# Patient Record
Sex: MALE | Race: BLACK OR AFRICAN AMERICAN | Employment: FULL TIME | URBAN - METROPOLITAN AREA
[De-identification: names, ages, dates, MRNs, and addresses within clinical notes are randomized per-mention and may not be internally consistent; named-entity substitution may affect disease eponyms.]

---

## 2017-05-03 ENCOUNTER — HOSPITAL ENCOUNTER (OUTPATIENT)
Dept: LAB | Age: 57
Discharge: HOME | End: 2017-05-03

## 2017-05-03 PROCEDURE — 88305 TISSUE EXAM BY PATHOLOGIST: CPT | Performed by: UROLOGY

## 2017-08-25 PROBLEM — A60.00 HERPES SIMPLEX INFECTION OF GENITOURINARY SYSTEM: Status: ACTIVE | Noted: 2017-08-25

## 2018-02-07 ENCOUNTER — APPOINTMENT (OUTPATIENT)
Dept: GENERAL RADIOLOGY | Age: 58
End: 2018-02-07
Payer: COMMERCIAL

## 2018-02-07 ENCOUNTER — HOSPITAL ENCOUNTER (EMERGENCY)
Age: 58
Discharge: HOME | End: 2018-02-07
Attending: EMERGENCY MEDICINE
Payer: COMMERCIAL

## 2018-02-07 VITALS
DIASTOLIC BLOOD PRESSURE: 89 MMHG | RESPIRATION RATE: 16 BRPM | WEIGHT: 272 LBS | HEIGHT: 71 IN | BODY MASS INDEX: 38.08 KG/M2 | TEMPERATURE: 98.1 F | OXYGEN SATURATION: 98 % | SYSTOLIC BLOOD PRESSURE: 148 MMHG | HEART RATE: 68 BPM

## 2018-02-07 DIAGNOSIS — M54.50 ACUTE MIDLINE LOW BACK PAIN WITHOUT SCIATICA: Primary | ICD-10-CM

## 2018-02-07 PROCEDURE — 81003 URINALYSIS AUTO W/O SCOPE: CPT | Performed by: PHYSICIAN ASSISTANT

## 2018-02-07 PROCEDURE — 96372 THER/PROPH/DIAG INJ SC/IM: CPT | Performed by: PHYSICIAN ASSISTANT

## 2018-02-07 PROCEDURE — 72100 X-RAY EXAM L-S SPINE 2/3 VWS: CPT

## 2018-02-07 PROCEDURE — 74011250637 HC RX REV CODE- 250/637: Performed by: PHYSICIAN ASSISTANT

## 2018-02-07 PROCEDURE — 74011250636 HC RX REV CODE- 250/636: Performed by: PHYSICIAN ASSISTANT

## 2018-02-07 PROCEDURE — 99283 EMERGENCY DEPT VISIT LOW MDM: CPT | Performed by: PHYSICIAN ASSISTANT

## 2018-02-07 RX ORDER — METHOCARBAMOL 750 MG/1
750 TABLET, FILM COATED ORAL 4 TIMES DAILY
Qty: 40 TAB | Refills: 0 | Status: SHIPPED | OUTPATIENT
Start: 2018-02-07 | End: 2018-02-07

## 2018-02-07 RX ORDER — ONDANSETRON 8 MG/1
8 TABLET, ORALLY DISINTEGRATING ORAL
Status: COMPLETED | OUTPATIENT
Start: 2018-02-07 | End: 2018-02-07

## 2018-02-07 RX ORDER — NAPROXEN 500 MG/1
500 TABLET ORAL 2 TIMES DAILY WITH MEALS
Qty: 20 TAB | Refills: 0 | Status: SHIPPED | OUTPATIENT
Start: 2018-02-07 | End: 2018-02-17

## 2018-02-07 RX ORDER — METHOCARBAMOL 750 MG/1
750 TABLET, FILM COATED ORAL 4 TIMES DAILY
Qty: 40 TAB | Refills: 0 | Status: SHIPPED | OUTPATIENT
Start: 2018-02-07 | End: 2018-02-19

## 2018-02-07 RX ORDER — MORPHINE SULFATE 2 MG/ML
4 INJECTION, SOLUTION INTRAMUSCULAR; INTRAVENOUS
Status: COMPLETED | OUTPATIENT
Start: 2018-02-07 | End: 2018-02-07

## 2018-02-07 RX ADMIN — MORPHINE SULFATE 4 MG: 2 INJECTION, SOLUTION INTRAMUSCULAR; INTRAVENOUS at 12:44

## 2018-02-07 RX ADMIN — ONDANSETRON 8 MG: 8 TABLET, ORALLY DISINTEGRATING ORAL at 12:44

## 2018-02-07 NOTE — ED NOTES
I have reviewed discharge instructions with the patient. The patient verbalized understanding. Patient left ED via Discharge Method: ambulatory to Home with self and spouse. Opportunity for questions and clarification provided. Patient given 2 scripts. Work note provided. To continue your aftercare when you leave the hospital, you may receive an automated call from our care team to check in on how you are doing. This is a free service and part of our promise to provide the best care and service to meet your aftercare needs.  If you have questions, or wish to unsubscribe from this service please call [phone]. Thank you for Choosing our [facility] Emergency Department.

## 2018-02-07 NOTE — LETTER
[facility] EMERGENCY DEPT 
[address] [place] [address] [id number] [phone] Work/School Note Date: 2/7/2018 To Whom It May concern: 
 
Augustine Muñoz was seen and treated today in the emergency room by the following provider(s): 
Attending Provider: Gómez Hankins MD 
Physician Assistant: ROJAS Quan. Augustine Muñoz may return to work on 2-8-18. Sincerely, ROJAS Quan

## 2018-02-07 NOTE — DISCHARGE INSTRUCTIONS
Back Pain: Care Instructions  Your Care Instructions    Back pain has many possible causes. It is often related to problems with muscles and ligaments of the back. It may also be related to problems with the nerves, discs, or bones of the back. Moving, lifting, standing, sitting, or sleeping in an awkward way can strain the back. Sometimes you don't notice the injury until later. Arthritis is another common cause of back pain. Although it may hurt a lot, back pain usually improves on its own within several weeks. Most people recover in 12 weeks or less. Using good home treatment and being careful not to stress your back can help you feel better sooner. Follow-up care is a key part of your treatment and safety. Be sure to make and go to all appointments, and call your doctor if you are having problems. It's also a good idea to know your test results and keep a list of the medicines you take. How can you care for yourself at home? · Sit or lie in positions that are most comfortable and reduce your pain. Try one of these positions when you lie down:  ¨ Lie on your back with your knees bent and supported by large pillows. ¨ Lie on the floor with your legs on the seat of a sofa or chair. [place] on your side with your knees and hips bent and a pillow between your legs. ¨ Lie on your stomach if it does not make pain worse. · Do not sit up in bed, and avoid soft couches and twisted positions. Bed rest can help relieve pain at first, but it delays healing. Avoid bed rest after the first day of back pain. · Change positions every 30 minutes. If you must sit for long periods of time, take breaks from sitting. Get up and walk around, or lie in a comfortable position. · Try using a heating pad on a low or medium setting for 15 to 20 minutes every 2 or 3 hours. Try a warm shower in place of one session with the heating pad. · You can also try an ice pack for 10 to 15 minutes every 2 to 3 hours.  Put a thin cloth between the ice pack and your skin. · Take pain medicines exactly as directed. ¨ If the doctor gave you a prescription medicine for pain, take it as prescribed. ¨ If you are not taking a prescription pain medicine, ask your doctor if you can take an over-the-counter medicine. · Take short walks several times a day. You can start with 5 to 10 minutes, 3 or 4 times a day, and work up to longer walks. Walk on level surfaces and avoid hills and stairs until your back is better. · Return to work and other activities as soon as you can. Continued rest without activity is usually not good for your back. · To prevent future back pain, do exercises to stretch and strengthen your back and stomach. Learn how to use good posture, safe lifting techniques, and proper body mechanics. When should you call for help? Call your doctor now or seek immediate medical care if:  ? · You have new or worsening numbness in your legs. ? · You have new or worsening weakness in your legs. (This could make it hard to stand up.)   ? · You lose control of your bladder or bowels. ? Watch closely for changes in your health, and be sure to contact your doctor if:  ? · Your pain gets worse. ? · You are not getting better after 2 weeks. Where can you learn more? Go to http://christine-eddie.info/. Enter K017 in the search box to learn more about \"Back Pain: Care Instructions. \"  Current as of: March 21, 2017  Content Version: 11.4  © 8954-8038 [employer] [facility] EHR. Care instructions adapted under license by [employer] (which disclaims liability or warranty for this information). If you have questions about a medical condition or this instruction, always ask your healthcare professional. [address] any warranty or liability for your use of this information. Back Stretches: Exercises  Your Care Instructions  Here are some examples of exercises for stretching your back.  Start each exercise slowly. Ease off the exercise if you start to have pain. Your doctor or physical therapist will tell you when you can start these exercises and which ones will work best for you. How to do the exercises  Overhead stretch    1. Stand comfortably with your feet shoulder-width apart. 2. Looking straight ahead, raise both arms over your head and reach toward the ceiling. Do not allow your head to tilt back. 3. Hold for 15 to 30 seconds, then lower your arms to your sides. 4. Repeat 2 to 4 times. Side stretch    1. Stand comfortably with your feet shoulder-width apart. 2. Raise one arm over your head, and then lean to the other side. 3. Slide your hand down your leg as you let the weight of your arm gently stretch your side muscles. Hold for 15 to 30 seconds. 4. Repeat 2 to 4 times on each side. Press-up    1. Lie on your stomach, supporting your body with your forearms. 2. Press your elbows down into the floor to raise your upper back. As you do this, relax your stomach muscles and allow your back to arch without using your back muscles. As your press up, do not let your hips or pelvis come off the floor. 3. Hold for 15 to 30 seconds, then relax. 4. Repeat 2 to 4 times. Relax and rest    1. Lie on your back with a rolled towel under your neck and a pillow under your knees. Extend your arms comfortably to your sides. 2. Relax and breathe normally. 3. Remain in this position for about 10 minutes. 4. If you can, do this 2 or 3 times each day. Follow-up care is a key part of your treatment and safety. Be sure to make and go to all appointments, and call your doctor if you are having problems. It's also a good idea to know your test results and keep a list of the medicines you take. Where can you learn more? Go to http://christine-eddie.info/. Enter O791 in the search box to learn more about \"Back Stretches: Exercises. \"  Current as of: March 21, 2017  Content Version: 11.4  © 9876-3726 [employer]. Care instructions adapted under license by [employer] (which disclaims liability or warranty for this information). If you have questions about a medical condition or this instruction, always ask your healthcare professional. [address] any warranty or liability for your use of this information.

## 2018-02-07 NOTE — ED PROVIDER NOTES
Patient is a 56 y.o. male presenting with back pain. The history is provided by the patient. Back Pain    This is a new problem. The current episode started more than 2 days ago. The problem has been gradually worsening. The problem occurs rarely. Patient reports not work related injury. The pain is associated with no known injury. The pain is present in the lumbar spine. The quality of the pain is described as aching. The pain does not radiate. The pain is at a severity of 8/10. The pain is moderate. The symptoms are aggravated by certain positions. The pain is the same all the time. Pertinent negatives include no bladder incontinence, no dysuria, no leg pain, no paresthesias, no paresis, no tingling and no weakness. He has tried NSAIDs for the symptoms. The treatment provided no relief. Risk factors include a sedentary lifestyle. 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    Hypercholesteremia     Hyperlipidemia 4/27/2015    Hypertension     Lateral epicondylitis     Mild concentric left ventricular hypertrophy (LVH)     Obesity     Other ill-defined conditions(799.89)     seasonal allergies    Vitamin D deficiency 4/27/2015       Past Surgical History:   Procedure Laterality Date    HX COLONOSCOPY  2013    no polyps    HX HERNIA REPAIR  05/2012    Bilateral inguinal hernia         Family History:   Problem Relation Age of Onset    Hypertension Mother     Lung Disease Father     Hypertension Sister     Hypertension Sister        Social History     Social History    Marital status: SINGLE     Spouse name: N/A    Number of children: N/A    Years of education: N/A     Occupational History    Not on file.      Social History Main Topics    Smoking status: Never Smoker    Smokeless tobacco: Never Used    Alcohol use No    Drug use: No    Sexual activity: Not on file     Other Topics Concern    Not on file     Social History Narrative         ALLERGIES: Review of patient's allergies indicates no known allergies. Review of Systems   Genitourinary: Negative for bladder incontinence and dysuria. Musculoskeletal: Positive for back pain. Neurological: Negative for tingling, weakness and paresthesias. All other systems reviewed and are negative. Vitals:    02/07/18 1121   BP: (!) 153/92   Pulse: 69   Resp: 17   Temp: 98.1 °F (36.7 °C)   SpO2: 98%   Weight: 123.4 kg (272 lb)   Height: 5' 11\" (1.803 m)            Physical Exam   Constitutional: He is oriented to person, place, and time. He appears well-developed and well-nourished. No distress. HENT:   Head: Normocephalic and atraumatic. Eyes: Conjunctivae and EOM are normal. Pupils are equal, round, and reactive to light. Neck: Normal range of motion. Neck supple. Cardiovascular: Normal rate and regular rhythm. Pulmonary/Chest: Effort normal and breath sounds normal. No respiratory distress. He has no wheezes. Abdominal: Soft. Bowel sounds are normal.   Musculoskeletal: He exhibits tenderness. He exhibits no edema. Back:    Pain to palpation mid lower back area, no flank pain, no pain into buttocks or legs,no skin changes    Neurological: He is alert and oriented to person, place, and time. Skin: Skin is warm. Nursing note and vitals reviewed.        MDM  Number of Diagnoses or Management Options  Diagnosis management comments: Urine dip -  l spine x rays -  Feel pain is muscular given morphine 4 mg im in er  rx for robaxin and naprosyn, work note given        Amount and/or Complexity of Data Reviewed  Clinical lab tests: ordered and reviewed  Tests in the radiology section of CPT®: ordered and reviewed  Review and summarize past medical records: yes    Risk of Complications, Morbidity, and/or Mortality  Presenting problems: moderate  Diagnostic procedures: moderate  Management options: low    Patient Progress  Patient progress: improved        ED Course       Procedures

## 2018-02-19 PROBLEM — N50.819 TESTALGIA: Status: ACTIVE | Noted: 2018-02-19

## 2018-08-14 PROBLEM — E66.01 SEVERE OBESITY (BMI 35.0-39.9): Status: ACTIVE | Noted: 2018-08-14

## 2018-10-30 ENCOUNTER — HOSPITAL ENCOUNTER (OUTPATIENT)
Dept: LAB | Age: 58
Discharge: HOME | End: 2018-10-30

## 2018-10-30 PROBLEM — Z98.890 HISTORY OF COLONOSCOPY: Status: ACTIVE | Noted: 2018-10-30

## 2018-10-30 PROCEDURE — 88305 TISSUE EXAM BY PATHOLOGIST: CPT

## 2018-11-14 PROBLEM — K63.5 HYPERPLASTIC POLYP OF ASCENDING COLON: Status: ACTIVE | Noted: 2018-11-14

## 2019-05-25 ENCOUNTER — HOSPITAL ENCOUNTER (OUTPATIENT)
Dept: CT IMAGING | Age: 59
Discharge: HOME | End: 2019-05-25
Attending: FAMILY MEDICINE
Payer: COMMERCIAL

## 2019-05-25 DIAGNOSIS — G89.29 CHRONIC RLQ PAIN: ICD-10-CM

## 2019-05-25 DIAGNOSIS — G89.29 CHRONIC RIGHT-SIDED LOW BACK PAIN WITH RIGHT-SIDED SCIATICA: ICD-10-CM

## 2019-05-25 DIAGNOSIS — R10.31 CHRONIC RLQ PAIN: ICD-10-CM

## 2019-05-25 DIAGNOSIS — R11.0 NAUSEA: ICD-10-CM

## 2019-05-25 DIAGNOSIS — M54.41 CHRONIC RIGHT-SIDED LOW BACK PAIN WITH RIGHT-SIDED SCIATICA: ICD-10-CM

## 2019-05-25 LAB — CREAT BLD-MCNC: 1.3 MG/DL (ref 0.8–1.5)

## 2019-05-25 PROCEDURE — 74011000258 HC RX REV CODE- 258: Performed by: FAMILY MEDICINE

## 2019-05-25 PROCEDURE — 74011636320 HC RX REV CODE- 636/320: Performed by: FAMILY MEDICINE

## 2019-05-25 PROCEDURE — 74177 CT ABD & PELVIS W/CONTRAST: CPT

## 2019-05-25 PROCEDURE — 82565 ASSAY OF CREATININE: CPT

## 2019-05-25 RX ORDER — SODIUM CHLORIDE 0.9 % (FLUSH) 0.9 %
10 SYRINGE (ML) INJECTION
Status: COMPLETED | OUTPATIENT
Start: 2019-05-25 | End: 2019-05-25

## 2019-05-25 RX ADMIN — SODIUM CHLORIDE 100 ML: 900 INJECTION, SOLUTION INTRAVENOUS at 10:58

## 2019-05-25 RX ADMIN — Medication 10 ML: at 10:58

## 2019-05-25 RX ADMIN — DIATRIZOATE MEGLUMINE AND DIATRIZOATE SODIUM 15 ML: 660; 100 LIQUID ORAL; RECTAL at 10:58

## 2019-05-25 RX ADMIN — IOPAMIDOL 100 ML: 755 INJECTION, SOLUTION INTRAVENOUS at 10:58

## 2019-06-21 ENCOUNTER — HOSPITAL ENCOUNTER (OUTPATIENT)
Dept: LAB | Age: 59
Discharge: HOME | End: 2019-06-21
Payer: COMMERCIAL

## 2019-06-21 DIAGNOSIS — D72.819 LEUKOPENIA, UNSPECIFIED TYPE: ICD-10-CM

## 2019-06-21 PROBLEM — E66.01 SEVERE OBESITY (HCC): Status: ACTIVE | Noted: 2019-06-21

## 2019-06-21 LAB
ALBUMIN SERPL-MCNC: 4.2 G/DL (ref 3.5–5)
ALBUMIN/GLOB SERPL: 1 {RATIO} (ref 1.2–3.5)
ALP SERPL-CCNC: 69 U/L (ref 50–136)
ALT SERPL-CCNC: 40 U/L (ref 12–65)
ANION GAP SERPL CALC-SCNC: 6 MMOL/L (ref 7–16)
AST SERPL-CCNC: 29 U/L (ref 15–37)
BASOPHILS # BLD: 0 K/UL (ref 0–0.2)
BASOPHILS NFR BLD: 0 % (ref 0–2)
BILIRUB SERPL-MCNC: 0.5 MG/DL (ref 0.2–1.1)
BUN SERPL-MCNC: 13 MG/DL (ref 6–23)
CALCIUM SERPL-MCNC: 10 MG/DL (ref 8.3–10.4)
CHLORIDE SERPL-SCNC: 104 MMOL/L (ref 98–107)
CO2 SERPL-SCNC: 29 MMOL/L (ref 21–32)
CREAT SERPL-MCNC: 1.27 MG/DL (ref 0.8–1.5)
DIFFERENTIAL METHOD BLD: ABNORMAL
EOSINOPHIL # BLD: 0.1 K/UL (ref 0–0.8)
EOSINOPHIL NFR BLD: 2 % (ref 0.5–7.8)
ERYTHROCYTE [DISTWIDTH] IN BLOOD BY AUTOMATED COUNT: 14.8 % (ref 11.9–14.6)
GLOBULIN SER CALC-MCNC: 4.3 G/DL (ref 2.3–3.5)
GLUCOSE SERPL-MCNC: 97 MG/DL (ref 65–100)
HBV SURFACE AB SERPL IA-ACNC: 13.94 MIU/ML
HCT VFR BLD AUTO: 39 % (ref 41.1–50.3)
HGB BLD-MCNC: 12.6 G/DL (ref 13.6–17.2)
IMM GRANULOCYTES # BLD AUTO: 0 K/UL (ref 0–0.5)
IMM GRANULOCYTES NFR BLD AUTO: 0 % (ref 0–5)
KAPPA LC FREE SER-MCNC: 21.51 MG/L (ref 3.3–19.4)
KAPPA LC FREE/LAMBDA FREE SER: 1.05 {RATIO} (ref 0.26–1.65)
LAMBDA LC FREE SERPL-MCNC: 20.5 MG/L (ref 5.71–26.3)
LYMPHOCYTES # BLD: 0.8 K/UL (ref 0.5–4.6)
LYMPHOCYTES NFR BLD: 26 % (ref 13–44)
MCH RBC QN AUTO: 26.5 PG (ref 26.1–32.9)
MCHC RBC AUTO-ENTMCNC: 32.3 G/DL (ref 31.4–35)
MCV RBC AUTO: 81.9 FL (ref 79.6–97.8)
MONOCYTES # BLD: 0.4 K/UL (ref 0.1–1.3)
MONOCYTES NFR BLD: 12 % (ref 4–12)
NEUTS SEG # BLD: 1.8 K/UL (ref 1.7–8.2)
NEUTS SEG NFR BLD: 60 % (ref 43–78)
NRBC # BLD: 0 K/UL (ref 0–0.2)
PLATELET # BLD AUTO: 256 K/UL (ref 150–450)
PMV BLD AUTO: 10.8 FL (ref 9.4–12.3)
POTASSIUM SERPL-SCNC: 4.2 MMOL/L (ref 3.5–5.1)
PROT SERPL-MCNC: 8.5 G/DL (ref 6.3–8.2)
RBC # BLD AUTO: 4.76 M/UL (ref 4.23–5.67)
SODIUM SERPL-SCNC: 139 MMOL/L (ref 136–145)
WBC # BLD AUTO: 3 K/UL (ref 4.3–11.1)

## 2019-06-21 PROCEDURE — 84165 PROTEIN E-PHORESIS SERUM: CPT

## 2019-06-21 PROCEDURE — 86706 HEP B SURFACE ANTIBODY: CPT

## 2019-06-21 PROCEDURE — 36415 COLL VENOUS BLD VENIPUNCTURE: CPT

## 2019-06-21 PROCEDURE — 86705 HEP B CORE ANTIBODY IGM: CPT

## 2019-06-21 PROCEDURE — 85025 COMPLETE CBC W/AUTO DIFF WBC: CPT

## 2019-06-21 PROCEDURE — 80053 COMPREHEN METABOLIC PANEL: CPT

## 2019-06-21 PROCEDURE — 86803 HEPATITIS C AB TEST: CPT

## 2019-06-21 PROCEDURE — 83883 ASSAY NEPHELOMETRY NOT SPEC: CPT

## 2019-06-22 LAB
HBV CORE IGM SERPL QL IA: NEGATIVE
HCV AB S/CO SERPL IA: <0.1 S/CO RATIO (ref 0–0.9)

## 2019-06-24 LAB
ALBUMIN SERPL ELPH-MCNC: 4.29 G/DL (ref 3.2–5.6)
ALBUMIN/GLOB SERPL: 1.1 {RATIO}
ALPHA1 GLOB SERPL ELPH-MCNC: 0.17 G/DL (ref 0.1–0.4)
ALPHA2 GLOB SERPL ELPH-MCNC: 0.75 G/DL (ref 0.4–1.2)
B-GLOBULIN SERPL QL ELPH: 1.08 G/DL (ref 0.6–1.3)
GAMMA GLOB MFR SERPL ELPH: 2.01 G/DL (ref 0.5–1.6)
M PROTEIN SERPL ELPH-MCNC: ABNORMAL G/DL
PROT PATTERN SERPL ELPH-IMP: ABNORMAL
PROT SERPL-MCNC: 8.3 G/DL (ref 6.3–8.2)

## 2019-08-06 PROBLEM — N52.9 VASCULOGENIC ERECTILE DYSFUNCTION: Status: ACTIVE | Noted: 2019-08-06

## 2020-02-06 PROBLEM — D72.819 LEUKOPENIA: Status: ACTIVE | Noted: 2020-02-06

## 2020-09-03 PROBLEM — E66.01 SEVERE OBESITY (HCC): Status: ACTIVE | Noted: 2020-09-03

## 2020-09-30 ENCOUNTER — HOSPITAL ENCOUNTER (OUTPATIENT)
Dept: MRI IMAGING | Age: 60
Discharge: HOME | End: 2020-09-30
Attending: UROLOGY
Payer: COMMERCIAL

## 2020-09-30 DIAGNOSIS — R97.20 ELEVATED PSA: ICD-10-CM

## 2020-09-30 PROCEDURE — 74011000258 HC RX REV CODE- 258: Performed by: UROLOGY

## 2020-09-30 PROCEDURE — 72197 MRI PELVIS W/O & W/DYE: CPT

## 2020-09-30 PROCEDURE — A9575 INJ GADOTERATE MEGLUMI 0.1ML: HCPCS | Performed by: UROLOGY

## 2020-09-30 PROCEDURE — 74011250636 HC RX REV CODE- 250/636: Performed by: UROLOGY

## 2020-09-30 RX ORDER — SODIUM CHLORIDE 0.9 % (FLUSH) 0.9 %
10 SYRINGE (ML) INJECTION
Status: COMPLETED | OUTPATIENT
Start: 2020-09-30 | End: 2020-09-30

## 2020-09-30 RX ORDER — GADOTERATE MEGLUMINE 376.9 MG/ML
24 INJECTION INTRAVENOUS
Status: COMPLETED | OUTPATIENT
Start: 2020-09-30 | End: 2020-09-30

## 2020-09-30 RX ADMIN — Medication 10 ML: at 07:47

## 2020-09-30 RX ADMIN — SODIUM CHLORIDE 100 ML: 900 INJECTION, SOLUTION INTRAVENOUS at 07:47

## 2020-09-30 RX ADMIN — GADOTERATE MEGLUMINE 24 ML: 376.9 INJECTION INTRAVENOUS at 07:47

## 2022-03-18 PROBLEM — N50.819 TESTALGIA: Status: ACTIVE | Noted: 2018-02-19

## 2022-03-18 PROBLEM — D72.819 LEUKOPENIA: Status: ACTIVE | Noted: 2020-02-06

## 2022-03-19 PROBLEM — A60.00 HERPES SIMPLEX INFECTION OF GENITOURINARY SYSTEM: Status: ACTIVE | Noted: 2017-08-25

## 2022-03-19 PROBLEM — N52.9 VASCULOGENIC ERECTILE DYSFUNCTION: Status: ACTIVE | Noted: 2019-08-06

## 2022-03-20 PROBLEM — K63.5 HYPERPLASTIC POLYP OF ASCENDING COLON: Status: ACTIVE | Noted: 2018-11-14

## 2022-03-29 ENCOUNTER — ANESTHESIA EVENT (OUTPATIENT)
Dept: SURGERY | Age: 62
End: 2022-03-29
Payer: COMMERCIAL

## 2022-03-30 ENCOUNTER — HOSPITAL ENCOUNTER (OUTPATIENT)
Age: 62
Setting detail: OUTPATIENT SURGERY
Discharge: HOME | End: 2022-03-30
Attending: UROLOGY | Admitting: UROLOGY
Payer: COMMERCIAL

## 2022-03-30 ENCOUNTER — ANESTHESIA (OUTPATIENT)
Dept: SURGERY | Age: 62
End: 2022-03-30
Payer: COMMERCIAL

## 2022-03-30 VITALS
HEIGHT: 71 IN | WEIGHT: 270 LBS | BODY MASS INDEX: 37.8 KG/M2 | OXYGEN SATURATION: 99 % | RESPIRATION RATE: 18 BRPM | TEMPERATURE: 98 F | HEART RATE: 68 BPM | DIASTOLIC BLOOD PRESSURE: 92 MMHG | SYSTOLIC BLOOD PRESSURE: 130 MMHG

## 2022-03-30 DIAGNOSIS — R97.20 PSA ELEVATION: ICD-10-CM

## 2022-03-30 PROCEDURE — 74011000250 HC RX REV CODE- 250: Performed by: NURSE ANESTHETIST, CERTIFIED REGISTERED

## 2022-03-30 PROCEDURE — 64590 INS/RPL PRPH SAC/GSTR NPG/R: CPT | Performed by: UROLOGY

## 2022-03-30 PROCEDURE — 88305 TISSUE EXAM BY PATHOLOGIST: CPT

## 2022-03-30 PROCEDURE — 76010000138 HC OR TIME 0.5 TO 1 HR: Performed by: UROLOGY

## 2022-03-30 PROCEDURE — 2709999900 HC NON-CHARGEABLE SUPPLY: Performed by: UROLOGY

## 2022-03-30 PROCEDURE — 74011250636 HC RX REV CODE- 250/636: Performed by: NURSE ANESTHETIST, CERTIFIED REGISTERED

## 2022-03-30 PROCEDURE — 76210000006 HC OR PH I REC 0.5 TO 1 HR: Performed by: UROLOGY

## 2022-03-30 PROCEDURE — 77030003481 HC NDL BIOP GUN BARD -B: Performed by: UROLOGY

## 2022-03-30 PROCEDURE — 74011000250 HC RX REV CODE- 250: Performed by: UROLOGY

## 2022-03-30 PROCEDURE — 76060000032 HC ANESTHESIA 0.5 TO 1 HR: Performed by: UROLOGY

## 2022-03-30 PROCEDURE — 74011250636 HC RX REV CODE- 250/636: Performed by: UROLOGY

## 2022-03-30 PROCEDURE — 76942 ECHO GUIDE FOR BIOPSY: CPT | Performed by: UROLOGY

## 2022-03-30 PROCEDURE — 74011250636 HC RX REV CODE- 250/636: Performed by: ANESTHESIOLOGY

## 2022-03-30 PROCEDURE — 76210000020 HC REC RM PH II FIRST 0.5 HR: Performed by: UROLOGY

## 2022-03-30 RX ORDER — ALBUTEROL SULFATE 0.83 MG/ML
2.5 SOLUTION RESPIRATORY (INHALATION) AS NEEDED
Status: DISCONTINUED | OUTPATIENT
Start: 2022-03-30 | End: 2022-03-30 | Stop reason: HOSPADM

## 2022-03-30 RX ORDER — PROCHLORPERAZINE EDISYLATE 5 MG/ML
5 INJECTION INTRAMUSCULAR; INTRAVENOUS ONCE
Status: DISCONTINUED | OUTPATIENT
Start: 2022-03-30 | End: 2022-03-30 | Stop reason: HOSPADM

## 2022-03-30 RX ORDER — PROPOFOL 10 MG/ML
INJECTION, EMULSION INTRAVENOUS
Status: DISCONTINUED | OUTPATIENT
Start: 2022-03-30 | End: 2022-03-30 | Stop reason: HOSPADM

## 2022-03-30 RX ORDER — FENTANYL CITRATE 50 UG/ML
100 INJECTION, SOLUTION INTRAMUSCULAR; INTRAVENOUS ONCE
Status: DISCONTINUED | OUTPATIENT
Start: 2022-03-30 | End: 2022-03-30 | Stop reason: HOSPADM

## 2022-03-30 RX ORDER — OXYCODONE HYDROCHLORIDE 5 MG/1
5 TABLET ORAL
Status: DISCONTINUED | OUTPATIENT
Start: 2022-03-30 | End: 2022-03-30 | Stop reason: HOSPADM

## 2022-03-30 RX ORDER — MIDAZOLAM HYDROCHLORIDE 1 MG/ML
2 INJECTION, SOLUTION INTRAMUSCULAR; INTRAVENOUS
Status: DISCONTINUED | OUTPATIENT
Start: 2022-03-30 | End: 2022-03-30 | Stop reason: HOSPADM

## 2022-03-30 RX ORDER — CEFAZOLIN SODIUM/WATER 2 G/20 ML
SYRINGE (ML) INTRAVENOUS AS NEEDED
Status: DISCONTINUED | OUTPATIENT
Start: 2022-03-30 | End: 2022-03-30 | Stop reason: HOSPADM

## 2022-03-30 RX ORDER — HYDROMORPHONE HYDROCHLORIDE 2 MG/ML
0.5 INJECTION, SOLUTION INTRAMUSCULAR; INTRAVENOUS; SUBCUTANEOUS
Status: DISCONTINUED | OUTPATIENT
Start: 2022-03-30 | End: 2022-03-30 | Stop reason: HOSPADM

## 2022-03-30 RX ORDER — ACETAMINOPHEN 500 MG
1000 TABLET ORAL ONCE
Status: DISCONTINUED | OUTPATIENT
Start: 2022-03-30 | End: 2022-03-30 | Stop reason: HOSPADM

## 2022-03-30 RX ORDER — MIDAZOLAM HYDROCHLORIDE 1 MG/ML
2 INJECTION, SOLUTION INTRAMUSCULAR; INTRAVENOUS ONCE
Status: DISCONTINUED | OUTPATIENT
Start: 2022-03-30 | End: 2022-03-30 | Stop reason: HOSPADM

## 2022-03-30 RX ORDER — LIDOCAINE HYDROCHLORIDE 20 MG/ML
INJECTION, SOLUTION EPIDURAL; INFILTRATION; INTRACAUDAL; PERINEURAL AS NEEDED
Status: DISCONTINUED | OUTPATIENT
Start: 2022-03-30 | End: 2022-03-30 | Stop reason: HOSPADM

## 2022-03-30 RX ORDER — SODIUM CHLORIDE, SODIUM LACTATE, POTASSIUM CHLORIDE, CALCIUM CHLORIDE 600; 310; 30; 20 MG/100ML; MG/100ML; MG/100ML; MG/100ML
50 INJECTION, SOLUTION INTRAVENOUS CONTINUOUS
Status: DISCONTINUED | OUTPATIENT
Start: 2022-03-30 | End: 2022-03-30 | Stop reason: HOSPADM

## 2022-03-30 RX ORDER — SODIUM CHLORIDE, SODIUM LACTATE, POTASSIUM CHLORIDE, CALCIUM CHLORIDE 600; 310; 30; 20 MG/100ML; MG/100ML; MG/100ML; MG/100ML
75 INJECTION, SOLUTION INTRAVENOUS CONTINUOUS
Status: DISCONTINUED | OUTPATIENT
Start: 2022-03-30 | End: 2022-03-30 | Stop reason: HOSPADM

## 2022-03-30 RX ORDER — LIDOCAINE HYDROCHLORIDE 10 MG/ML
0.1 INJECTION INFILTRATION; PERINEURAL AS NEEDED
Status: DISCONTINUED | OUTPATIENT
Start: 2022-03-30 | End: 2022-03-30 | Stop reason: HOSPADM

## 2022-03-30 RX ORDER — FAMOTIDINE 10 MG/ML
INJECTION INTRAVENOUS
Status: DISCONTINUED
Start: 2022-03-30 | End: 2022-03-30 | Stop reason: WASHOUT

## 2022-03-30 RX ORDER — PROPOFOL 10 MG/ML
INJECTION, EMULSION INTRAVENOUS AS NEEDED
Status: DISCONTINUED | OUTPATIENT
Start: 2022-03-30 | End: 2022-03-30 | Stop reason: HOSPADM

## 2022-03-30 RX ADMIN — PHENYLEPHRINE HYDROCHLORIDE 100 MCG: 10 INJECTION INTRAVENOUS at 17:13

## 2022-03-30 RX ADMIN — PHENYLEPHRINE HYDROCHLORIDE 100 MCG: 10 INJECTION INTRAVENOUS at 17:09

## 2022-03-30 RX ADMIN — PROPOFOL 50 MG: 10 INJECTION, EMULSION INTRAVENOUS at 16:52

## 2022-03-30 RX ADMIN — PROPOFOL 200 MCG/KG/MIN: 10 INJECTION, EMULSION INTRAVENOUS at 16:52

## 2022-03-30 RX ADMIN — SODIUM CHLORIDE, SODIUM LACTATE, POTASSIUM CHLORIDE, AND CALCIUM CHLORIDE 75 ML/HR: 600; 310; 30; 20 INJECTION, SOLUTION INTRAVENOUS at 13:54

## 2022-03-30 RX ADMIN — LIDOCAINE HYDROCHLORIDE 60 MG: 20 INJECTION, SOLUTION EPIDURAL; INFILTRATION; INTRACAUDAL; PERINEURAL at 16:52

## 2022-03-30 RX ADMIN — PHENYLEPHRINE HYDROCHLORIDE 100 MCG: 10 INJECTION INTRAVENOUS at 17:16

## 2022-03-30 RX ADMIN — CEFAZOLIN SODIUM 3 G: 100 INJECTION, POWDER, LYOPHILIZED, FOR SOLUTION INTRAVENOUS at 14:01

## 2022-03-30 RX ADMIN — Medication 3 G: at 17:00

## 2022-03-30 NOTE — ANESTHESIA PREPROCEDURE EVALUATION
Relevant Problems   CARDIOVASCULAR   (+) HTN with concentric LVH       Anesthetic History   No history of anesthetic complications            Review of Systems / Medical History  Patient summary reviewed, nursing notes reviewed and pertinent labs reviewed    Pulmonary  Within defined limits                 Neuro/Psych   Within defined limits           Cardiovascular    Hypertension: well controlled          Hyperlipidemia    Exercise tolerance: >4 METS     GI/Hepatic/Renal  Within defined limits              Endo/Other        Obesity     Other Findings   Comments: BPH         Physical Exam    Airway  Mallampati: II      Mouth opening: Normal     Cardiovascular  Regular rate and rhythm,  S1 and S2 normal,  no murmur, click, rub, or gallop             Dental    Dentition: Upper partial plate     Pulmonary  Breath sounds clear to auscultation               Abdominal         Other Findings            Anesthetic Plan    ASA: 2  Anesthesia type: total IV anesthesia          Induction: Intravenous  Anesthetic plan and risks discussed with: Patient and Spouse

## 2022-03-30 NOTE — OP NOTES
[facility]  OPERATIVE REPORT    Name:  Gretel Stallings  MR#:  [id number]  :  1960  ACCOUNT #:  [de-identified]  DATE OF SERVICE:  2022    PREOPERATIVE DIAGNOSIS:  Elevated prostate-specific antigen. POSTOPERATIVE DIAGNOSIS:  Elevated prostate-specific antigen. PROCEDURE PERFORMED:  Saturation biopsy of the prostate. SURGEON:  Salina Gross MD    ASSISTANT:  None    ANESTHESIA:  Sedation. COMPLICATIONS:  None. SPECIMENS REMOVED:  Multiple prostate biopsies as described in the text. ESTIMATED BLOOD LOSS:  Minimal.    PROCEDURE:  The patient was taken to the OR and after sedation was achieved, he was placed in the left lateral decubitus position. Rectal exam was performed. This revealed a large gland without any asymmetry, nodule, or induration. Transrectal ultrasound probe was inserted and the prostate was imaged in real time. The prostate is enlarged. There was some corpora amylacea in the transitional zone, but otherwise, appeared unremarkable as did the seminal vesicles. I then proceeded to take the biopsies. Five biopsies were taken from either apex, either mid gland and either base for a total of 30 biopsies. The patient tolerated the procedure well. There were no complications.       Irena Cuevas MD      [id number]  D:  2022 18:14  T:  2022 18:35  JOB #:  [id number]

## 2022-03-30 NOTE — PERIOP NOTES
Discharge instructions given to patient's wife, Sanju Adams, at bedside. Verbalized understanding. No questions at this time.

## 2022-03-30 NOTE — ANESTHESIA POSTPROCEDURE EVALUATION
Procedure(s):  SATURATION BIOPSY OF PROSTATE.    total IV anesthesia    Anesthesia Post Evaluation      Multimodal analgesia: multimodal analgesia used between 6 hours prior to anesthesia start to PACU discharge  Patient location during evaluation: PACU  Patient participation: complete - patient participated  Level of consciousness: awake  Pain management: adequate  Airway patency: patent  Anesthetic complications: no  Cardiovascular status: acceptable  Respiratory status: acceptable, spontaneous ventilation and nonlabored ventilation  Hydration status: acceptable  Post anesthesia nausea and vomiting:  none      INITIAL Post-op Vital signs:   Vitals Value Taken Time   /95 03/30/22 1757   Temp 36.4 °C (97.6 °F) 03/30/22 1735   Pulse 70 03/30/22 1759   Resp 14 03/30/22 1735   SpO2 100 % 03/30/22 1759   Vitals shown include unvalidated device data.

## 2022-03-30 NOTE — BRIEF OP NOTE
Brief Postoperative Note    Patient: Carey Bai  YOB: 1960  MRN: [id number]    Date of Procedure: 3/30/2022     Pre-Op Diagnosis: Elevated PSA [R97.20]  Benign prostatic hyperplasia, unspecified whether lower urinary tract symptoms present [N40.0]    Post-Op Diagnosis:Same    Procedure(s):  SATURATION BIOPSY OF PROSTATE    Surgeon(s):  Miguel Saab MD    Surgical Assistant: None    Anesthesia: MAC     Estimated Blood Loss (mL): Minimal    Complications: None    Specimens:   ID Type Source Tests Collected by Time Destination   1 : RIGHT APEX Preservative Prostate  Miguel Saab MD 3/30/2022 1718 Pathology   2 : RIGHT MID Preservative Prostate  Miguel Saab MD 3/30/2022 1718 Pathology   3 : RIGHT BASE Preservative Prostate  Miguel Saab MD 3/30/2022 1718 Pathology   4 : LEFT APEX Preservative Prostate  Miguel Saab MD 3/30/2022 1718 Pathology   5 : LEFT MID Preservative Prostate  Miguel Saab MD 3/30/2022 1718 Pathology   6 : LEFT BASE Preservative Prostate  Miguel Saab MD 3/30/2022 1718 Pathology        Drains:None      Electronically Signed by Daisha Mendiola MD on 3/30/2022 at 5:57 PM

## 2022-04-20 PROBLEM — F11.99 OPIOID USE, UNSPECIFIED WITH UNSPECIFIED OPIOID-INDUCED DISORDER (HCC): Status: ACTIVE | Noted: 2022-04-20

## 2022-07-11 ENCOUNTER — OFFICE VISIT (OUTPATIENT)
Dept: FAMILY MEDICINE CLINIC | Facility: CLINIC | Age: 62
End: 2022-07-11
Payer: COMMERCIAL

## 2022-07-11 VITALS
DIASTOLIC BLOOD PRESSURE: 74 MMHG | OXYGEN SATURATION: 97 % | HEART RATE: 79 BPM | WEIGHT: 270 LBS | HEIGHT: 71 IN | SYSTOLIC BLOOD PRESSURE: 122 MMHG | BODY MASS INDEX: 37.8 KG/M2

## 2022-07-11 DIAGNOSIS — D70.9 NEUTROPENIA, UNSPECIFIED TYPE (HCC): ICD-10-CM

## 2022-07-11 DIAGNOSIS — I10 PRIMARY HYPERTENSION: Primary | ICD-10-CM

## 2022-07-11 DIAGNOSIS — R77.1 ELEVATED SERUM GLOBULIN LEVEL: ICD-10-CM

## 2022-07-11 DIAGNOSIS — E78.00 PURE HYPERCHOLESTEROLEMIA: ICD-10-CM

## 2022-07-11 DIAGNOSIS — N40.0 BENIGN PROSTATIC HYPERPLASIA WITHOUT LOWER URINARY TRACT SYMPTOMS: ICD-10-CM

## 2022-07-11 PROBLEM — F11.99 OPIOID USE, UNSPECIFIED WITH UNSPECIFIED OPIOID-INDUCED DISORDER (HCC): Status: RESOLVED | Noted: 2022-04-20 | Resolved: 2022-07-11

## 2022-07-11 PROBLEM — D72.819 LEUKOPENIA: Status: ACTIVE | Noted: 2020-02-06

## 2022-07-11 PROCEDURE — 99214 OFFICE O/P EST MOD 30 MIN: CPT | Performed by: FAMILY MEDICINE

## 2022-07-11 RX ORDER — CYCLOBENZAPRINE HCL 10 MG
10 TABLET ORAL 2 TIMES DAILY PRN
Qty: 90 TABLET | Refills: 1 | Status: SHIPPED | OUTPATIENT
Start: 2022-07-11

## 2022-07-11 RX ORDER — SILDENAFIL 100 MG/1
100 TABLET, FILM COATED ORAL PRN
Qty: 30 TABLET | Refills: 1 | Status: SHIPPED | OUTPATIENT
Start: 2022-07-11 | End: 2022-07-11 | Stop reason: SDUPTHER

## 2022-07-11 RX ORDER — AMLODIPINE BESYLATE AND BENAZEPRIL HYDROCHLORIDE 5; 20 MG/1; MG/1
1 CAPSULE ORAL DAILY
Qty: 90 CAPSULE | Refills: 1 | Status: SHIPPED | OUTPATIENT
Start: 2022-07-11

## 2022-07-11 RX ORDER — SILDENAFIL 100 MG/1
100 TABLET, FILM COATED ORAL PRN
Qty: 30 TABLET | Refills: 1 | Status: SHIPPED | OUTPATIENT
Start: 2022-07-11 | End: 2022-07-19

## 2022-07-11 RX ORDER — VALACYCLOVIR HYDROCHLORIDE 1 G/1
1000 TABLET, FILM COATED ORAL 2 TIMES DAILY
Qty: 180 TABLET | Refills: 1 | Status: SHIPPED | OUTPATIENT
Start: 2022-07-11

## 2022-07-11 RX ORDER — ATORVASTATIN CALCIUM 20 MG/1
20 TABLET, FILM COATED ORAL DAILY
Qty: 90 TABLET | Refills: 1 | Status: SHIPPED | OUTPATIENT
Start: 2022-07-11

## 2022-07-11 RX ORDER — TAMSULOSIN HYDROCHLORIDE 0.4 MG/1
0.4 CAPSULE ORAL DAILY
Qty: 90 CAPSULE | Refills: 1 | Status: SHIPPED | OUTPATIENT
Start: 2022-07-11

## 2022-07-11 ASSESSMENT — PATIENT HEALTH QUESTIONNAIRE - PHQ9
SUM OF ALL RESPONSES TO PHQ QUESTIONS 1-9: 0
SUM OF ALL RESPONSES TO PHQ QUESTIONS 1-9: 0
2. FEELING DOWN, DEPRESSED OR HOPELESS: 0
SUM OF ALL RESPONSES TO PHQ9 QUESTIONS 1 & 2: 0
1. LITTLE INTEREST OR PLEASURE IN DOING THINGS: 0
SUM OF ALL RESPONSES TO PHQ QUESTIONS 1-9: 0
SUM OF ALL RESPONSES TO PHQ QUESTIONS 1-9: 0

## 2022-07-11 NOTE — PROGRESS NOTES
Date/Time     01/20/2022 08:30 AM    K 4.3 01/20/2022 08:30 AM     01/20/2022 08:30 AM    CO2 24 01/20/2022 08:30 AM    BUN 10 01/20/2022 08:30 AM    CREATININE 1.23 01/20/2022 08:30 AM    GLUCOSE 91 01/20/2022 08:30 AM    CALCIUM 9.6 01/20/2022 08:30 AM          Objective:  Blood pressure 122/74, pulse 79, height 5' 11\" (1.803 m), weight 270 lb (122.5 kg), SpO2 97 %. Body mass index is 37.66 kg/m². BP Readings from Last 3 Encounters:   07/11/22 122/74   03/02/22 (!) 141/91   01/20/22 130/80     General- Pleasant and no distress  Psych- alert and oriented to person, place and time  Mood and affect are appropriate to situation  Musculoskeletal - Gait and station examination reveals mid-position with no abnormalities. Neurological- grossly intact. rrr s mrg.   bcta  extremeties are without edema, and dp, and pt pulses are intact  No carotid bruits   Fundoscopic exam is: benign without retinopathology     Assessment:  1. Primary hypertension    2. Neutropenia, unspecified type ([address]    3. Benign prostatic hyperplasia without lower urinary tract symptoms    4. Elevated serum globulin level    5. Pure hypercholesterolemia        Plan:   Prescription drug management occurs today as follows:  Because current regimen for blood pressure is now working and tolerated, will continue medications as listed    Manisha Aldridge has been given the following recommendations today due to his elevated BP reading: lab tests ordered. Personal instruction is given. For your information, consider the following:  Namita Earl is an excellent site that will give you a list of approved blood pressure devices  [employer] is an excellent site that will teach you how to take accurate bp measurements at home. Significant weight loss can result in a drop of 5-10mm blood pressure. A DASH diet (see bookstore or go to www.[employer] and print DASH diet) will lower 8-14mm blood pressure.   Salt restriction will lower your bp 2-8mm. Lowering alcohol consumption to moderate use will lower your pressure 2-4mm. Continue efforts to maintain an exercise regimen. Normal blood pressure target is now 120/80. Stage 1 or \"pre-hypertension\" or \"high normal hypertension\" is 130-139/80-89. We sometimes begin treatment with medications. Stage 2 is >140/90 which is when we always begin treatment with medications. For high risk patients such as those with heart disease, a history of stents, angioplasty, heart attacks, strokes, diabetes and chronic kidney disease,your blood pressure goal is 130/80. For lower risk patients without the high risk categories, your goal for blood pressure is 139/89. Unless you are older than 72years old we may try for a systolic bp of 578 or we will accept higher pressures if the lower pressures are not tolerated. 1. Primary hypertension  -     amLODIPine-benazepril (LOTREL) 5-20 MG per capsule; Take 1 capsule by mouth daily, Disp-90 capsule, R-1Normal  2. Neutropenia, unspecified type ([address]  3. Benign prostatic hyperplasia without lower urinary tract symptoms  4. Elevated serum globulin level  5. Pure hypercholesterolemia  -     atorvastatin (LIPITOR) 20 MG tablet; Take 1 tablet by mouth daily, Disp-90 tablet, R-1Normal      Followup:  Return for 6 mo for htn, lipids, labs.

## 2022-07-19 ENCOUNTER — TELEPHONE (OUTPATIENT)
Dept: FAMILY MEDICINE CLINIC | Facility: CLINIC | Age: 62
End: 2022-07-19

## 2022-07-19 RX ORDER — TADALAFIL 20 MG/1
20 TABLET ORAL DAILY PRN
Qty: 30 TABLET | Refills: 1 | Status: SHIPPED | OUTPATIENT
Start: 2022-07-19

## 2022-07-19 NOTE — TELEPHONE ENCOUNTER
Received notification from [facility] that insurance will not cover Sildenafil. Requesting different medication. Please advise.

## 2023-01-05 ENCOUNTER — OFFICE VISIT (OUTPATIENT)
Dept: FAMILY MEDICINE CLINIC | Facility: CLINIC | Age: 63
End: 2023-01-05
Payer: COMMERCIAL

## 2023-01-05 VITALS
HEART RATE: 81 BPM | WEIGHT: 280.2 LBS | DIASTOLIC BLOOD PRESSURE: 84 MMHG | OXYGEN SATURATION: 97 % | BODY MASS INDEX: 39.08 KG/M2 | SYSTOLIC BLOOD PRESSURE: 130 MMHG

## 2023-01-05 DIAGNOSIS — E78.00 PURE HYPERCHOLESTEROLEMIA: ICD-10-CM

## 2023-01-05 DIAGNOSIS — N40.0 BENIGN PROSTATIC HYPERPLASIA WITHOUT LOWER URINARY TRACT SYMPTOMS: ICD-10-CM

## 2023-01-05 DIAGNOSIS — I10 PRIMARY HYPERTENSION: Primary | ICD-10-CM

## 2023-01-05 DIAGNOSIS — D70.9 NEUTROPENIA, UNSPECIFIED TYPE (HCC): ICD-10-CM

## 2023-01-05 DIAGNOSIS — R25.2 MUSCLE CRAMP, NOCTURNAL: ICD-10-CM

## 2023-01-05 DIAGNOSIS — E66.01 MORBID OBESITY (HCC): ICD-10-CM

## 2023-01-05 PROBLEM — D72.819 LEUKOPENIA: Status: RESOLVED | Noted: 2020-02-06 | Resolved: 2023-01-05

## 2023-01-05 PROCEDURE — 3079F DIAST BP 80-89 MM HG: CPT | Performed by: FAMILY MEDICINE

## 2023-01-05 PROCEDURE — 3075F SYST BP GE 130 - 139MM HG: CPT | Performed by: FAMILY MEDICINE

## 2023-01-05 PROCEDURE — 99214 OFFICE O/P EST MOD 30 MIN: CPT | Performed by: FAMILY MEDICINE

## 2023-01-05 RX ORDER — ATORVASTATIN CALCIUM 20 MG/1
20 TABLET, FILM COATED ORAL DAILY
Qty: 90 TABLET | Refills: 1 | Status: SHIPPED | OUTPATIENT
Start: 2023-01-05

## 2023-01-05 RX ORDER — AMLODIPINE BESYLATE AND BENAZEPRIL HYDROCHLORIDE 5; 20 MG/1; MG/1
1 CAPSULE ORAL DAILY
Qty: 90 CAPSULE | Refills: 1 | Status: SHIPPED | OUTPATIENT
Start: 2023-01-05

## 2023-01-05 RX ORDER — TADALAFIL 20 MG/1
20 TABLET ORAL DAILY PRN
Qty: 30 TABLET | Refills: 1 | Status: SHIPPED | OUTPATIENT
Start: 2023-01-05

## 2023-01-05 RX ORDER — TAMSULOSIN HYDROCHLORIDE 0.4 MG/1
0.4 CAPSULE ORAL DAILY
Qty: 90 CAPSULE | Refills: 1 | Status: SHIPPED | OUTPATIENT
Start: 2023-01-05

## 2023-01-05 RX ORDER — CYCLOBENZAPRINE HCL 10 MG
10 TABLET ORAL NIGHTLY PRN
Qty: 10 TABLET | Refills: 0 | Status: SHIPPED | OUTPATIENT
Start: 2023-01-05 | End: 2023-01-15

## 2023-01-05 ASSESSMENT — PATIENT HEALTH QUESTIONNAIRE - PHQ9
SUM OF ALL RESPONSES TO PHQ QUESTIONS 1-9: 0
SUM OF ALL RESPONSES TO PHQ QUESTIONS 1-9: 0
1. LITTLE INTEREST OR PLEASURE IN DOING THINGS: 0
SUM OF ALL RESPONSES TO PHQ9 QUESTIONS 1 & 2: 0
SUM OF ALL RESPONSES TO PHQ QUESTIONS 1-9: 0
SUM OF ALL RESPONSES TO PHQ QUESTIONS 1-9: 0
2. FEELING DOWN, DEPRESSED OR HOPELESS: 0

## 2023-01-05 NOTE — PROGRESS NOTES
Subjective:  Adriana Hillman is a 63 y.o. male presents today for their semi-annual htn and lipids, and ED refills and visit. They are having no side effects and are doing well. Sees PGU for bph and psa following  Also about a week ago thought he heard something at night set up quickly and strained his neck on the right side. Still little sore. Systems review of cardiovascular and pulmonary systems reveal no complaints or pertinent positives. Patient denies any pounding heart beats or rapid heart beat intervals, flushing, panic like attacks, headaches, dizzyness or flushing. They have drug reistant hypertension, on 3 or more different medicaitons including one diuretic- No    How much are you exercising? Every other day   Do you smoke? No  Are you taking your medicines as directed most every day? Yes  Do you follow a low sodium DASH diet or similar high blood pressure diet? Yes  Do you check your bp at home with an automated blood pressure device? Yes  If you don't have a home bp machine, you should purchase one at home and begin to check your pressure at home on a regular basis. Your blood pressure goal is listed under the \"plan section\" below  PHQ-9 Total Score: 0 (1/5/2023  7:49 AM)    No Known Allergies   reports that he has never smoked.  He has never used smokeless tobacco.  Current Outpatient Medications   Medication Sig Dispense Refill    tadalafil (CIALIS) 20 MG tablet Take 1 tablet by mouth daily as needed for Erectile Dysfunction 30 tablet 1    amLODIPine-benazepril (LOTREL) 5-20 MG per capsule Take 1 capsule by mouth daily 90 capsule 1    atorvastatin (LIPITOR) 20 MG tablet Take 1 tablet by mouth daily 90 tablet 1    tamsulosin (FLOMAX) 0.4 MG capsule Take 1 capsule by mouth daily 90 capsule 1    cyclobenzaprine (FLEXERIL) 10 MG tablet Take 1 tablet by mouth nightly as needed for Muscle spasms 10 tablet 0    cyclobenzaprine (FLEXERIL) 10 MG tablet Take 1 tablet by mouth 2 times daily as needed for Muscle spasms 90 tablet 1    valACYclovir (VALTREX) 1 g tablet Take 1 tablet by mouth 2 times daily 180 tablet 1     No current facility-administered medications for this visit. Lab Results   Component Value Date/Time     01/20/2022 08:30 AM    K 4.3 01/20/2022 08:30 AM     01/20/2022 08:30 AM    CO2 24 01/20/2022 08:30 AM    BUN 10 01/20/2022 08:30 AM    CREATININE 1.23 01/20/2022 08:30 AM    GLUCOSE 91 01/20/2022 08:30 AM    CALCIUM 9.6 01/20/2022 08:30 AM          Objective:  Blood pressure 130/84, pulse 81, weight 280 lb 3.2 oz (127.1 kg), SpO2 97 %. Body mass index is 39.08 kg/m². BP Readings from Last 3 Encounters:   01/05/23 130/84   07/11/22 122/74   03/02/22 (!) 141/91     General- Pleasant and no distress  Psych- alert and oriented to person, place and time  Mood and affect are appropriate to situation  Musculoskeletal - Gait and station examination reveals mid-position with no abnormalities. Neurological- grossly intact. rrr s mrg.   bcta  extremeties are without edema, and dp, and pt pulses are intact  No carotid bruits   Fundoscopic exam is: benign without retinopathology     Assessment:  1. Primary hypertension    2. Neutropenia, unspecified type ([facility] [address]    3. Pure hypercholesterolemia    4. Morbid obesity ([facility] [address]    5. Benign prostatic hyperplasia without lower urinary tract symptoms    6. Muscle cramp, nocturnal        Plan:   Prescription drug management occurs today as follows:  Because current regimen for blood pressure is now working and tolerated, will continue medications as listed  Flexeril at bedtime for a week or so should help his neck soreness  Ana Earl has been given the following recommendations today due to his elevated BP reading: lab tests ordered. Personal instruction is given.   For your information, consider the following:  [facility] is an excellent site that will give you a list of approved blood pressure devices  Targetbp.org is an excellent site that will teach you how to take accurate bp measurements at home. Significant weight loss can result in a drop of 5-10mm blood pressure. A DASH diet (see bookstore or go to www.[employer] and print DASH diet) will lower 8-14mm blood pressure. Salt restriction will lower your bp 2-8mm. Lowering alcohol consumption to moderate use will lower your pressure 2-4mm. Continue efforts to maintain an exercise regimen. Normal blood pressure target is now 120/80. Stage 1 or \"pre-hypertension\" or \"high normal hypertension\" is 130-139/80-89. We sometimes begin treatment with medications. Stage 2 is >140/90 which is when we always begin treatment with medications. For high risk patients such as those with heart disease, a history of stents, angioplasty, heart attacks, strokes, diabetes and chronic kidney disease,your blood pressure goal is 130/80. For lower risk patients without the high risk categories, your goal for blood pressure is 139/89. Unless you are older than 72years old we may try for a systolic bp of 069 or we will accept higher pressures if the lower pressures are not tolerated. 1. Primary hypertension  -     amLODIPine-benazepril (LOTREL) 5-20 MG per capsule; Take 1 capsule by mouth daily, Disp-90 capsule, R-1Normal  -     Basic Metabolic Panel; Future  2. Neutropenia, unspecified type [facility])  Assessment & Plan:   Asymptomatic, continue current medications and continue current treatment plan    3. Pure hypercholesterolemia  -     atorvastatin (LIPITOR) 20 MG tablet; Take 1 tablet by mouth daily, Disp-90 tablet, R-1Normal  -     Lipid Panel; Future  4. Morbid obesity ([facility] [address]  5. Benign prostatic hyperplasia without lower urinary tract symptoms  -     tadalafil (CIALIS) 20 MG tablet; Take 1 tablet by mouth daily as needed for Erectile Dysfunction, Disp-30 tablet, R-1Print  -     tamsulosin (FLOMAX) 0.4 MG capsule; Take 1 capsule by mouth daily, Disp-90 capsule, R-1Normal  6.  Muscle cramp, nocturnal  -     cyclobenzaprine (FLEXERIL) 10 MG tablet; Take 1 tablet by mouth nightly as needed for Muscle spasms, Disp-10 tablet, R-0Normal      Followup:  Return for 6 mo for bp recheck.

## 2023-01-06 LAB
ANION GAP SERPL CALC-SCNC: 4 MMOL/L (ref 2–11)
BUN SERPL-MCNC: 14 MG/DL (ref 8–23)
CALCIUM SERPL-MCNC: 9.2 MG/DL (ref 8.3–10.4)
CHLORIDE SERPL-SCNC: 109 MMOL/L (ref 101–110)
CHOLEST SERPL-MCNC: 89 MG/DL
CO2 SERPL-SCNC: 28 MMOL/L (ref 21–32)
CREAT SERPL-MCNC: 1.2 MG/DL (ref 0.8–1.5)
GLUCOSE SERPL-MCNC: 96 MG/DL (ref 65–100)
HDLC SERPL-MCNC: 42 MG/DL (ref 40–60)
HDLC SERPL: 2.1 {RATIO}
LDLC SERPL CALC-MCNC: 31.6 MG/DL
POTASSIUM SERPL-SCNC: 4.1 MMOL/L (ref 3.5–5.1)
SODIUM SERPL-SCNC: 141 MMOL/L (ref 133–143)
TRIGL SERPL-MCNC: 77 MG/DL (ref 35–150)
VLDLC SERPL CALC-MCNC: 15.4 MG/DL (ref 6–23)

## 2023-04-04 ENCOUNTER — OFFICE VISIT (OUTPATIENT)
Dept: UROLOGY | Age: 63
End: 2023-04-04
Payer: COMMERCIAL

## 2023-04-04 DIAGNOSIS — N52.9 ERECTILE DYSFUNCTION, UNSPECIFIED ERECTILE DYSFUNCTION TYPE: ICD-10-CM

## 2023-04-04 DIAGNOSIS — R97.20 ELEVATED PROSTATE SPECIFIC ANTIGEN (PSA): Primary | ICD-10-CM

## 2023-04-04 DIAGNOSIS — N40.1 BENIGN PROSTATIC HYPERPLASIA WITH LOWER URINARY TRACT SYMPTOMS, SYMPTOM DETAILS UNSPECIFIED: ICD-10-CM

## 2023-04-04 LAB
BILIRUBIN, URINE, POC: NEGATIVE
BLOOD URINE, POC: NEGATIVE
GLUCOSE URINE, POC: NEGATIVE
KETONES, URINE, POC: NORMAL
LEUKOCYTE ESTERASE, URINE, POC: NEGATIVE
NITRITE, URINE, POC: NEGATIVE
PH, URINE, POC: 5.5 (ref 4.6–8)
PROTEIN,URINE, POC: NEGATIVE
SPECIFIC GRAVITY, URINE, POC: 1.03 (ref 1–1.03)
URINALYSIS CLARITY, POC: NORMAL
URINALYSIS COLOR, POC: NORMAL
UROBILINOGEN, POC: NORMAL

## 2023-04-04 PROCEDURE — 99213 OFFICE O/P EST LOW 20 MIN: CPT | Performed by: UROLOGY

## 2023-04-04 PROCEDURE — 81003 URINALYSIS AUTO W/O SCOPE: CPT | Performed by: UROLOGY

## 2023-04-04 RX ORDER — TADALAFIL 5 MG/1
5 TABLET ORAL DAILY
Qty: 90 TABLET | Refills: 1 | Status: SHIPPED | OUTPATIENT
Start: 2023-04-04

## 2023-04-04 ASSESSMENT — ENCOUNTER SYMPTOMS
EYES NEGATIVE: 1
RESPIRATORY NEGATIVE: 1

## 2023-04-04 NOTE — PROGRESS NOTES
PSA 10.1 January 2022 (free PSA 16.7%)   PSA 7.3 August 2020  PSA 7.9 February 2020  PSA 4.9 February 2019  PSA 5.9 February 2018  PSA 5.5 January 2017  PSA 5.4 January 2016  PSA 4.7 January 2016      Patient is doing well with Flomax with respect to lower urinary tract symptoms. He does have Cialis 20 mg at home but has not used it in quite some time. He is status post 2 negative biopsies as well as a negative MRI. Prostate volume was estimated at 90 cm³ which would give us a PSA density of 0.11. Rectal exam reveals a large prostate without asymmetry, nodule, or induration. We are going to continue Flomax. He would like to try daily Cialis and I will send this to his pharmacy. PSA is drawn today. If this is reassuring he will follow-up with me in 1 year.
education level: Not on file   Occupational History    Not on file   Tobacco Use    Smoking status: Never    Smokeless tobacco: Never   Substance and Sexual Activity    Alcohol use: No     Alcohol/week: 0.0 standard drinks    Drug use: No    Sexual activity: Not on file   Other Topics Concern    Not on file   Social History Narrative    Not on file     Social Determinants of Health     Financial Resource Strain: Not on file   Food Insecurity: Not on file   Transportation Needs: Not on file   Physical Activity: Not on file   Stress: Not on file   Social Connections: Not on file   Intimate Partner Violence: Not on file   Housing Stability: Not on file     Family History   Problem Relation Age of Onset    Hypertension Sister     Hypertension Sister     Hypertension Mother     Lung Disease Father        Review of Systems  Constitutional: Negative  Skin: Negative skin ROS  Eyes: Eyes negative  ENT: HENT negative  Respiratory: Respiratory negative  Cardiovascular: Positive for hypertension.   GI: Neg GI ROS  Genitourinary: Genitourinary negative  Musculoskeletal: Musculoskeletal negative  Neurological: Neg neuro ROS  Psychological: Neg psych ROS  Endocrine: Endocrine negative  Hem/Lymphatic: Hematologic/lymphatic negative    Urinalysis  UA - Dipstick  Results for orders placed or performed in visit on 04/04/23   AMB POC URINALYSIS DIP STICK AUTO W/O MICRO   Result Value Ref Range    Color (UA POC)      Clarity (UA POC)      Glucose, Urine, POC Negative Negative    Bilirubin, Urine, POC Negative Negative    KETONES, Urine, POC Trace Negative    Specific Gravity, Urine, POC 1.030 1.001 - 1.035    Blood (UA POC) Negative Negative    pH, Urine, POC 5.5 4.6 - 8.0    Protein, Urine, POC Negative Negative    Urobilinogen, POC Normal     Nitrite, Urine, POC Negative Negative    Leukocyte Esterase, Urine, POC Negative Negative   Results for orders placed or performed in visit on 05/22/19   AMB POC URINALYSIS DIP STICK

## 2023-07-07 ENCOUNTER — OFFICE VISIT (OUTPATIENT)
Dept: FAMILY MEDICINE CLINIC | Facility: CLINIC | Age: 63
End: 2023-07-07
Payer: COMMERCIAL

## 2023-07-07 VITALS
HEIGHT: 71 IN | OXYGEN SATURATION: 96 % | BODY MASS INDEX: 38.16 KG/M2 | HEART RATE: 77 BPM | SYSTOLIC BLOOD PRESSURE: 138 MMHG | DIASTOLIC BLOOD PRESSURE: 86 MMHG | WEIGHT: 272.6 LBS

## 2023-07-07 DIAGNOSIS — I10 PRIMARY HYPERTENSION: Primary | ICD-10-CM

## 2023-07-07 DIAGNOSIS — E66.01 MORBID OBESITY (HCC): ICD-10-CM

## 2023-07-07 DIAGNOSIS — E78.00 PURE HYPERCHOLESTEROLEMIA: ICD-10-CM

## 2023-07-07 DIAGNOSIS — N40.0 BENIGN PROSTATIC HYPERPLASIA WITHOUT LOWER URINARY TRACT SYMPTOMS: ICD-10-CM

## 2023-07-07 PROCEDURE — 99214 OFFICE O/P EST MOD 30 MIN: CPT | Performed by: FAMILY MEDICINE

## 2023-07-07 PROCEDURE — 3075F SYST BP GE 130 - 139MM HG: CPT | Performed by: FAMILY MEDICINE

## 2023-07-07 PROCEDURE — 3079F DIAST BP 80-89 MM HG: CPT | Performed by: FAMILY MEDICINE

## 2023-07-07 RX ORDER — AMLODIPINE BESYLATE AND BENAZEPRIL HYDROCHLORIDE 5; 20 MG/1; MG/1
1 CAPSULE ORAL DAILY
Qty: 90 CAPSULE | Refills: 1 | Status: SHIPPED | OUTPATIENT
Start: 2023-07-07

## 2023-07-07 RX ORDER — TAMSULOSIN HYDROCHLORIDE 0.4 MG/1
0.4 CAPSULE ORAL DAILY
Qty: 90 CAPSULE | Refills: 1 | Status: SHIPPED | OUTPATIENT
Start: 2023-07-07

## 2023-07-07 RX ORDER — ATORVASTATIN CALCIUM 20 MG/1
20 TABLET, FILM COATED ORAL DAILY
Qty: 90 TABLET | Refills: 1 | Status: SHIPPED | OUTPATIENT
Start: 2023-07-07

## 2023-07-07 SDOH — ECONOMIC STABILITY: FOOD INSECURITY: WITHIN THE PAST 12 MONTHS, THE FOOD YOU BOUGHT JUST DIDN'T LAST AND YOU DIDN'T HAVE MONEY TO GET MORE.: NEVER TRUE

## 2023-07-07 SDOH — ECONOMIC STABILITY: FOOD INSECURITY: WITHIN THE PAST 12 MONTHS, YOU WORRIED THAT YOUR FOOD WOULD RUN OUT BEFORE YOU GOT MONEY TO BUY MORE.: NEVER TRUE

## 2023-07-07 SDOH — ECONOMIC STABILITY: INCOME INSECURITY: HOW HARD IS IT FOR YOU TO PAY FOR THE VERY BASICS LIKE FOOD, HOUSING, MEDICAL CARE, AND HEATING?: NOT HARD AT ALL

## 2023-07-07 SDOH — ECONOMIC STABILITY: HOUSING INSECURITY
IN THE LAST 12 MONTHS, WAS THERE A TIME WHEN YOU DID NOT HAVE A STEADY PLACE TO SLEEP OR SLEPT IN A SHELTER (INCLUDING NOW)?: NO

## 2023-07-07 ASSESSMENT — PATIENT HEALTH QUESTIONNAIRE - PHQ9
2. FEELING DOWN, DEPRESSED OR HOPELESS: 0
SUM OF ALL RESPONSES TO PHQ QUESTIONS 1-9: 0
SUM OF ALL RESPONSES TO PHQ9 QUESTIONS 1 & 2: 0
SUM OF ALL RESPONSES TO PHQ QUESTIONS 1-9: 0
SUM OF ALL RESPONSES TO PHQ QUESTIONS 1-9: 0
1. LITTLE INTEREST OR PLEASURE IN DOING THINGS: 0
SUM OF ALL RESPONSES TO PHQ QUESTIONS 1-9: 0

## 2023-07-07 NOTE — PROGRESS NOTES
Subjective:  Brian Dotson is a 63 y.o. male presents today for their semi-annual htn and dyslipidemia and BPH refills visit. They are having no side effects and are doing well. Systems review of cardiovascular and pulmonary systems reveal no complaints or pertinent positives. Patient denies any pounding heart beats or rapid heart beat intervals, flushing, panic like attacks, headaches, dizzyness or flushing. They have drug reistant hypertension, on 3 or more different medicaitons including one diuretic- No  PHQ-9 Total Score: 0 (7/7/2023  8:00 AM)    He is also frustrated with inability to lose weight despite eliminating  How much are you exercising? Every other day. Do you smoke? No  Are you taking your medicines as directed most every day? Yes  Do you follow a low sodium DASH diet or similar high blood pressure diet? No  Do you check your bp at home with an automated blood pressure device? Yes  If you don't have a home bp machine, you should purchase one at home and begin to check your pressure at home on a regular basis. Your blood pressure goal is listed under the \"plan section\" below    No Known Allergies   reports that he has never smoked.  He has never used smokeless tobacco.  Current Outpatient Medications   Medication Sig Dispense Refill    amLODIPine-benazepril (LOTREL) 5-20 MG per capsule Take 1 capsule by mouth daily 90 capsule 1    atorvastatin (LIPITOR) 20 MG tablet Take 1 tablet by mouth daily 90 tablet 1    tamsulosin (FLOMAX) 0.4 MG capsule Take 1 capsule by mouth daily 90 capsule 1    tadalafil (CIALIS) 5 MG tablet Take 1 tablet by mouth daily 90 tablet 1    tadalafil (CIALIS) 20 MG tablet Take 1 tablet by mouth daily as needed for Erectile Dysfunction 30 tablet 1    cyclobenzaprine (FLEXERIL) 10 MG tablet Take 1 tablet by mouth 2 times daily as needed for Muscle spasms 90 tablet 1    valACYclovir (VALTREX) 1 g tablet Take 1 tablet by mouth 2 times daily 180 tablet 1     No current

## 2023-07-08 LAB
ANION GAP SERPL CALC-SCNC: 4 MMOL/L (ref 2–11)
BUN SERPL-MCNC: 15 MG/DL (ref 8–23)
CALCIUM SERPL-MCNC: 9.9 MG/DL (ref 8.3–10.4)
CHLORIDE SERPL-SCNC: 110 MMOL/L (ref 101–110)
CO2 SERPL-SCNC: 27 MMOL/L (ref 21–32)
CREAT SERPL-MCNC: 1.2 MG/DL (ref 0.8–1.5)
GLUCOSE SERPL-MCNC: 98 MG/DL (ref 65–100)
POTASSIUM SERPL-SCNC: 4.1 MMOL/L (ref 3.5–5.1)
SODIUM SERPL-SCNC: 141 MMOL/L (ref 133–143)

## 2023-07-16 ENCOUNTER — HOSPITAL ENCOUNTER (EMERGENCY)
Age: 63
Discharge: HOME | End: 2023-07-16
Attending: EMERGENCY MEDICINE
Payer: COMMERCIAL

## 2023-07-16 ENCOUNTER — APPOINTMENT (OUTPATIENT)
Dept: GENERAL RADIOLOGY | Age: 63
End: 2023-07-16
Payer: COMMERCIAL

## 2023-07-16 ENCOUNTER — APPOINTMENT (OUTPATIENT)
Dept: CT IMAGING | Age: 63
End: 2023-07-16
Payer: COMMERCIAL

## 2023-07-16 VITALS
WEIGHT: 270 LBS | HEIGHT: 71 IN | SYSTOLIC BLOOD PRESSURE: 148 MMHG | TEMPERATURE: 98 F | RESPIRATION RATE: 20 BRPM | OXYGEN SATURATION: 99 % | HEART RATE: 86 BPM | DIASTOLIC BLOOD PRESSURE: 77 MMHG | BODY MASS INDEX: 37.8 KG/M2

## 2023-07-16 DIAGNOSIS — R55 SYNCOPE AND COLLAPSE: Primary | ICD-10-CM

## 2023-07-16 DIAGNOSIS — D64.9 ANEMIA, UNSPECIFIED TYPE: ICD-10-CM

## 2023-07-16 DIAGNOSIS — E86.0 DEHYDRATION: ICD-10-CM

## 2023-07-16 LAB
ALBUMIN SERPL-MCNC: 4.2 G/DL (ref 3.2–4.6)
ALBUMIN/GLOB SERPL: 0.9 (ref 0.4–1.6)
ALP SERPL-CCNC: 71 U/L (ref 50–136)
ALT SERPL-CCNC: 32 U/L (ref 12–65)
ANION GAP SERPL CALC-SCNC: 6 MMOL/L (ref 2–11)
AST SERPL-CCNC: 29 U/L (ref 15–37)
BASOPHILS # BLD: 0 K/UL (ref 0–0.2)
BASOPHILS NFR BLD: 0 % (ref 0–2)
BILIRUB SERPL-MCNC: 0.3 MG/DL (ref 0.2–1.1)
BUN SERPL-MCNC: 19 MG/DL (ref 8–23)
CALCIUM SERPL-MCNC: 9.6 MG/DL (ref 8.3–10.4)
CHLORIDE SERPL-SCNC: 102 MMOL/L (ref 101–110)
CO2 SERPL-SCNC: 28 MMOL/L (ref 21–32)
CREAT SERPL-MCNC: 1.68 MG/DL (ref 0.8–1.5)
DIFFERENTIAL METHOD BLD: ABNORMAL
EOSINOPHIL # BLD: 0 K/UL (ref 0–0.8)
EOSINOPHIL NFR BLD: 0 % (ref 0.5–7.8)
ERYTHROCYTE [DISTWIDTH] IN BLOOD BY AUTOMATED COUNT: 18.4 % (ref 11.9–14.6)
GLOBULIN SER CALC-MCNC: 4.8 G/DL (ref 2.8–4.5)
GLUCOSE SERPL-MCNC: 103 MG/DL (ref 65–100)
HCT VFR BLD AUTO: 31.3 % (ref 41.1–50.3)
HGB BLD-MCNC: 8.9 G/DL (ref 13.6–17.2)
IMM GRANULOCYTES # BLD AUTO: 0 K/UL (ref 0–0.5)
IMM GRANULOCYTES NFR BLD AUTO: 0 % (ref 0–5)
LYMPHOCYTES # BLD: 0.5 K/UL (ref 0.5–4.6)
LYMPHOCYTES NFR BLD: 6 % (ref 13–44)
MCH RBC QN AUTO: 19.6 PG (ref 26.1–32.9)
MCHC RBC AUTO-ENTMCNC: 28.4 G/DL (ref 31.4–35)
MCV RBC AUTO: 69.1 FL (ref 82–102)
MONOCYTES # BLD: 0.4 K/UL (ref 0.1–1.3)
MONOCYTES NFR BLD: 5 % (ref 4–12)
NEUTS SEG # BLD: 7.9 K/UL (ref 1.7–8.2)
NEUTS SEG NFR BLD: 89 % (ref 43–78)
NRBC # BLD: 0 K/UL (ref 0–0.2)
PLATELET # BLD AUTO: 392 K/UL (ref 150–450)
PMV BLD AUTO: 10 FL (ref 9.4–12.3)
POTASSIUM SERPL-SCNC: 4.3 MMOL/L (ref 3.5–5.1)
PROT SERPL-MCNC: 9 G/DL (ref 6.3–8.2)
RBC # BLD AUTO: 4.53 M/UL (ref 4.23–5.6)
SODIUM SERPL-SCNC: 136 MMOL/L (ref 133–143)
TROPONIN I SERPL HS-MCNC: 12.4 PG/ML (ref 0–14)
TROPONIN I SERPL HS-MCNC: 7 PG/ML (ref 0–14)
WBC # BLD AUTO: 8.9 K/UL (ref 4.3–11.1)

## 2023-07-16 PROCEDURE — 6360000002 HC RX W HCPCS

## 2023-07-16 PROCEDURE — 80053 COMPREHEN METABOLIC PANEL: CPT

## 2023-07-16 PROCEDURE — 70450 CT HEAD/BRAIN W/O DYE: CPT

## 2023-07-16 PROCEDURE — 90714 TD VACC NO PRESV 7 YRS+ IM: CPT

## 2023-07-16 PROCEDURE — 73110 X-RAY EXAM OF WRIST: CPT

## 2023-07-16 PROCEDURE — 90471 IMMUNIZATION ADMIN: CPT

## 2023-07-16 PROCEDURE — 99285 EMERGENCY DEPT VISIT HI MDM: CPT

## 2023-07-16 PROCEDURE — 72125 CT NECK SPINE W/O DYE: CPT

## 2023-07-16 PROCEDURE — 71045 X-RAY EXAM CHEST 1 VIEW: CPT

## 2023-07-16 PROCEDURE — 84484 ASSAY OF TROPONIN QUANT: CPT

## 2023-07-16 PROCEDURE — 2580000003 HC RX 258

## 2023-07-16 PROCEDURE — 93005 ELECTROCARDIOGRAM TRACING: CPT | Performed by: EMERGENCY MEDICINE

## 2023-07-16 PROCEDURE — 96360 HYDRATION IV INFUSION INIT: CPT

## 2023-07-16 PROCEDURE — 85025 COMPLETE CBC W/AUTO DIFF WBC: CPT

## 2023-07-16 RX ORDER — 0.9 % SODIUM CHLORIDE 0.9 %
1000 INTRAVENOUS SOLUTION INTRAVENOUS ONCE
Status: COMPLETED | OUTPATIENT
Start: 2023-07-16 | End: 2023-07-16

## 2023-07-16 RX ORDER — TETANUS AND DIPHTHERIA TOXOIDS ADSORBED 2; 2 [LF]/.5ML; [LF]/.5ML
0.5 INJECTION INTRAMUSCULAR
Status: COMPLETED | OUTPATIENT
Start: 2023-07-16 | End: 2023-07-16

## 2023-07-16 RX ORDER — FERROUS SULFATE 325(65) MG
325 TABLET ORAL
Qty: 7 TABLET | Refills: 0 | Status: SHIPPED | OUTPATIENT
Start: 2023-07-16 | End: 2023-07-23

## 2023-07-16 RX ADMIN — TETANUS AND DIPHTHERIA TOXOIDS ADSORBED 0.5 ML: 2; 2 INJECTION INTRAMUSCULAR at 18:00

## 2023-07-16 RX ADMIN — SODIUM CHLORIDE 1000 ML: 9 INJECTION, SOLUTION INTRAVENOUS at 17:59

## 2023-07-16 ASSESSMENT — PAIN - FUNCTIONAL ASSESSMENT
PAIN_FUNCTIONAL_ASSESSMENT: 0-10
PAIN_FUNCTIONAL_ASSESSMENT: NONE - DENIES PAIN

## 2023-07-16 ASSESSMENT — ENCOUNTER SYMPTOMS
ABDOMINAL PAIN: 0
SHORTNESS OF BREATH: 0
BACK PAIN: 0
DIARRHEA: 0
NAUSEA: 0
VOMITING: 0

## 2023-07-16 ASSESSMENT — PAIN SCALES - GENERAL: PAINLEVEL_OUTOF10: 5

## 2023-07-16 ASSESSMENT — LIFESTYLE VARIABLES
HOW MANY STANDARD DRINKS CONTAINING ALCOHOL DO YOU HAVE ON A TYPICAL DAY: PATIENT DOES NOT DRINK
HOW OFTEN DO YOU HAVE A DRINK CONTAINING ALCOHOL: NEVER

## 2023-07-17 ENCOUNTER — OFFICE VISIT (OUTPATIENT)
Dept: FAMILY MEDICINE CLINIC | Facility: CLINIC | Age: 63
End: 2023-07-17
Payer: COMMERCIAL

## 2023-07-17 VITALS
SYSTOLIC BLOOD PRESSURE: 120 MMHG | OXYGEN SATURATION: 95 % | DIASTOLIC BLOOD PRESSURE: 86 MMHG | HEART RATE: 84 BPM | HEIGHT: 71 IN | BODY MASS INDEX: 37.8 KG/M2 | WEIGHT: 270 LBS

## 2023-07-17 DIAGNOSIS — E78.00 PURE HYPERCHOLESTEROLEMIA: ICD-10-CM

## 2023-07-17 DIAGNOSIS — I10 PRIMARY HYPERTENSION: ICD-10-CM

## 2023-07-17 DIAGNOSIS — H81.13 BENIGN PAROXYSMAL POSITIONAL VERTIGO DUE TO BILATERAL VESTIBULAR DISORDER: ICD-10-CM

## 2023-07-17 DIAGNOSIS — E66.01 MORBID OBESITY (HCC): ICD-10-CM

## 2023-07-17 DIAGNOSIS — R42 DIZZINESS: ICD-10-CM

## 2023-07-17 DIAGNOSIS — D50.9 HYPOCHROMIC ANEMIA: Primary | ICD-10-CM

## 2023-07-17 DIAGNOSIS — R55 SYNCOPE AND COLLAPSE: ICD-10-CM

## 2023-07-17 LAB
EKG ATRIAL RATE: 92 BPM
EKG DIAGNOSIS: NORMAL
EKG P AXIS: 64 DEGREES
EKG P-R INTERVAL: 204 MS
EKG Q-T INTERVAL: 346 MS
EKG QRS DURATION: 90 MS
EKG QTC CALCULATION (BAZETT): 427 MS
EKG R AXIS: 13 DEGREES
EKG T AXIS: 59 DEGREES
EKG VENTRICULAR RATE: 92 BPM

## 2023-07-17 PROCEDURE — 99213 OFFICE O/P EST LOW 20 MIN: CPT | Performed by: FAMILY MEDICINE

## 2023-07-17 PROCEDURE — 93010 ELECTROCARDIOGRAM REPORT: CPT | Performed by: INTERNAL MEDICINE

## 2023-07-17 PROCEDURE — 3079F DIAST BP 80-89 MM HG: CPT | Performed by: FAMILY MEDICINE

## 2023-07-17 PROCEDURE — 3074F SYST BP LT 130 MM HG: CPT | Performed by: FAMILY MEDICINE

## 2023-07-17 ASSESSMENT — PATIENT HEALTH QUESTIONNAIRE - PHQ9
SUM OF ALL RESPONSES TO PHQ QUESTIONS 1-9: 0
SUM OF ALL RESPONSES TO PHQ QUESTIONS 1-9: 0
SUM OF ALL RESPONSES TO PHQ9 QUESTIONS 1 & 2: 0
SUM OF ALL RESPONSES TO PHQ QUESTIONS 1-9: 0
SUM OF ALL RESPONSES TO PHQ QUESTIONS 1-9: 0
1. LITTLE INTEREST OR PLEASURE IN DOING THINGS: 0
2. FEELING DOWN, DEPRESSED OR HOPELESS: 0

## 2023-07-17 NOTE — PROGRESS NOTES
Subjective:   Frank Gamboa is a 63 y.o. male presents today for follow-up from ER yesterday where he presented following a syncopal collapse type episode that happened in the yard. He had been outside working in the heat. He does take antihypertensives. While in the ER he had normal EKG, normal troponins, normal CT scan of the head, and C-spine, and normal right wrist and chest x-ray however he was found to be profoundly hypochromic and microcytic anemic    No Known Allergies  PMH, MEDS reviewed     Objective:   Blood pressure 120/86, pulse 84, height 5' 11\" (1.803 m), weight 270 lb (122.5 kg), SpO2 95 %. General- Pleasant and no distress  Psych- alert and oriented to person, place and time  Mood and affect are appropriate to situation  Musculoskeletal - Gait and station examination reveals mid-position with no abnormalities. Neurological- grossly intact. rrr s mrg.   bcta  HEENT exam is normal  Assessment/Plan:     1. Hypochromic anemia    2. Dizziness    3. Benign paroxysmal positional vertigo due to bilateral vestibular disorder    4. Syncope and collapse    5. Primary hypertension    6. Pure hypercholesterolemia    7. Morbid obesity ([address])         1. Hypochromic anemia  -     Ferritin; Future  -     Iron; Future  -     Amb External Referral To Gastroenterology  2. Dizziness  3. Benign paroxysmal positional vertigo due to bilateral vestibular disorder  4. Syncope and collapse  -     Amb External Referral To Gastroenterology  5. Primary hypertension  -     TSH  6. Pure hypercholesterolemia  -     TSH  7. Morbid obesity ([address])  -     TSH    Advised to stay hydrated, absolutely no working outdoors, need to find out these results and why he is anemic. Followup:   Return if symptoms worsen or fail to improve.

## 2023-07-17 NOTE — DISCHARGE INSTRUCTIONS
You were evaluated in the emergency department today after passing out. Work-up today in the emergency department is very reassuring. No concerning findings on CT of your head, CT of your neck, x-ray of your chest, x-ray of your right wrist  No concerning findings on your EKG    Lab work today is also reassuring    You do have some anemia and your hemoglobin is 8.9. No indication that you are having a GI bleed based on your rectal exam.      I have written you a prescription for iron supplement to be taken daily with breakfast.  I do recommend that you take this medication with a stool softener as iron can be constipating.       Please call your primary care provider tomorrow to schedule follow-up next week to pee and collapse, dehydration and anemia    Please return to the emergency department should you develop chest pain, shortness of breath, signs and symptoms of stroke as listed in your discharge paperwork

## 2023-07-18 LAB
FERRITIN SERPL-MCNC: 15 NG/ML (ref 8–388)
IRON SERPL-MCNC: 17 UG/DL (ref 35–150)
TSH, 3RD GENERATION: 0.66 UIU/ML (ref 0.36–3.74)

## 2023-07-19 ENCOUNTER — HOSPITAL ENCOUNTER (OUTPATIENT)
Dept: GENERAL RADIOLOGY | Age: 63
Discharge: HOME | End: 2023-07-22
Payer: COMMERCIAL

## 2023-07-19 ENCOUNTER — TELEPHONE (OUTPATIENT)
Dept: FAMILY MEDICINE CLINIC | Facility: CLINIC | Age: 63
End: 2023-07-19

## 2023-07-19 DIAGNOSIS — M79.604 RIGHT LEG PAIN: Primary | ICD-10-CM

## 2023-07-19 DIAGNOSIS — M79.604 RIGHT LEG PAIN: ICD-10-CM

## 2023-07-19 PROCEDURE — 73552 X-RAY EXAM OF FEMUR 2/>: CPT

## 2023-07-19 PROCEDURE — 73502 X-RAY EXAM HIP UNI 2-3 VIEWS: CPT

## 2023-07-19 PROCEDURE — 72100 X-RAY EXAM L-S SPINE 2/3 VWS: CPT

## 2023-07-19 NOTE — TELEPHONE ENCOUNTER
Pt called to advise that ibuprofen is not working for the leg pain that was discussed at his last appointment. He is wanting to see if something can be called in please. [facility] [address].

## 2023-07-19 NOTE — PROGRESS NOTES
Mr. Lamar Mckeon stated his left side/leg was the one bothering him and even pointed to it as well. Tried to get a hold of Dr. Job Wynne office but everyone was at lunch and I couldn't get in touch with them. Orders were changed to the left side due to patient already being on the table and he stated he wasn't hurting on his right side at all, just the left.  Left hip, femur and low back imaging was done. -Sharbeverlyn Scale RT(R)(CT)

## 2023-07-20 ENCOUNTER — HOSPITAL ENCOUNTER (EMERGENCY)
Age: 63
Discharge: HOME | End: 2023-07-20
Attending: EMERGENCY MEDICINE
Payer: COMMERCIAL

## 2023-07-20 ENCOUNTER — APPOINTMENT (OUTPATIENT)
Dept: CT IMAGING | Age: 63
End: 2023-07-20
Payer: COMMERCIAL

## 2023-07-20 ENCOUNTER — TELEPHONE (OUTPATIENT)
Dept: FAMILY MEDICINE CLINIC | Facility: CLINIC | Age: 63
End: 2023-07-20

## 2023-07-20 ENCOUNTER — APPOINTMENT (OUTPATIENT)
Dept: ULTRASOUND IMAGING | Age: 63
End: 2023-07-20
Payer: COMMERCIAL

## 2023-07-20 VITALS
BODY MASS INDEX: 37.8 KG/M2 | RESPIRATION RATE: 16 BRPM | HEART RATE: 77 BPM | OXYGEN SATURATION: 99 % | DIASTOLIC BLOOD PRESSURE: 74 MMHG | WEIGHT: 270 LBS | TEMPERATURE: 97.8 F | SYSTOLIC BLOOD PRESSURE: 123 MMHG | HEIGHT: 71 IN

## 2023-07-20 DIAGNOSIS — M79.605 LEFT LEG PAIN: Primary | ICD-10-CM

## 2023-07-20 DIAGNOSIS — M79.652 PAIN IN LEFT THIGH: Primary | ICD-10-CM

## 2023-07-20 PROCEDURE — 73700 CT LOWER EXTREMITY W/O DYE: CPT

## 2023-07-20 PROCEDURE — 6370000000 HC RX 637 (ALT 250 FOR IP): Performed by: PHYSICIAN ASSISTANT

## 2023-07-20 PROCEDURE — 99284 EMERGENCY DEPT VISIT MOD MDM: CPT

## 2023-07-20 PROCEDURE — 93971 EXTREMITY STUDY: CPT

## 2023-07-20 RX ORDER — HYDROCODONE BITARTRATE AND ACETAMINOPHEN 5; 325 MG/1; MG/1
1 TABLET ORAL
Status: COMPLETED | OUTPATIENT
Start: 2023-07-20 | End: 2023-07-20

## 2023-07-20 RX ADMIN — HYDROCODONE BITARTRATE AND ACETAMINOPHEN 1 TABLET: 5; 325 TABLET ORAL at 18:10

## 2023-07-20 ASSESSMENT — PAIN SCALES - GENERAL
PAINLEVEL_OUTOF10: 7
PAINLEVEL_OUTOF10: 8
PAINLEVEL_OUTOF10: 8

## 2023-07-20 ASSESSMENT — PAIN - FUNCTIONAL ASSESSMENT
PAIN_FUNCTIONAL_ASSESSMENT: 0-10
PAIN_FUNCTIONAL_ASSESSMENT: PREVENTS OR INTERFERES SOME ACTIVE ACTIVITIES AND ADLS

## 2023-07-20 ASSESSMENT — PAIN DESCRIPTION - ORIENTATION: ORIENTATION: LEFT;UPPER

## 2023-07-20 ASSESSMENT — PAIN DESCRIPTION - DESCRIPTORS: DESCRIPTORS: SHARP

## 2023-07-20 ASSESSMENT — LIFESTYLE VARIABLES: HOW MANY STANDARD DRINKS CONTAINING ALCOHOL DO YOU HAVE ON A TYPICAL DAY: PATIENT DOES NOT DRINK

## 2023-07-20 NOTE — ED PROVIDER NOTES
ibuprofen and Tylenol at home with continued symptoms. Pain is worse with weightbearing and walking however he has no pain when sitting and being immobile. He was seen by his PCP who had ordered a DVT ultrasound study however patient was unaware of this and presented here for further work-up and evaluation. Pain does not radiate from his left mid thigh. He has not noticed any appreciable swelling. Pain is moderate to severe with weightbearing. The history is provided by the patient. No  was used. Vitals signs and nursing note reviewed:  Patient Vitals for the past 4 hrs:   Pulse Resp BP SpO2   07/20/23 2015 77 16 123/74 99 %          Physical Exam  Vitals and nursing note reviewed. Constitutional:       General: He is not in acute distress. Appearance: Normal appearance. He is not ill-appearing, toxic-appearing or diaphoretic. HENT:      Head: Normocephalic and atraumatic. Nose: Nose normal.      Mouth/Throat:      Mouth: Mucous membranes are moist.   Eyes:      Pupils: Pupils are equal, round, and reactive to light. Cardiovascular:      Rate and Rhythm: Normal rate. Pulmonary:      Effort: Pulmonary effort is normal. No respiratory distress. Abdominal:      General: Abdomen is flat. Palpations: Abdomen is soft. Tenderness: There is no abdominal tenderness. Musculoskeletal:         General: Normal range of motion. Cervical back: Normal range of motion. No rigidity. Legs:    Skin:     General: Skin is warm. Neurological:      General: No focal deficit present. Mental Status: He is alert. Psychiatric:         Mood and Affect: Mood normal.        Procedures    ED Course as of 07/20/23 2102   Thu Jul 20, 2023   1802 Patient with persistent left femur pain after having a fall 5 days ago. X-rays were negative. Pain is worse with weightbearing so we will obtain CT to rule out any occult fracture.  [MO]      ED Course User Index  [MO] JJ Bejarano        Orders Placed This Encounter   Procedures    CT FEMUR LEFT WO CONTRAST    Vascular duplex lower extremity venous left        Medications   HYDROcodone-acetaminophen (NORCO) 5-325 MG per tablet 1 tablet (1 tablet Oral Given 7/20/23 1810)       Discharge Medication List as of 7/20/2023  8:22 PM           Past Medical History:   Diagnosis Date    Benign essential hypertension     BPH (benign prostatic hypertrophy) 4/27/2015 03/2012; 11/2013; 11/2014    COVID-19 09/2021    no hospitalization: only symptom \"dizziness\"    CRP elevated     Elevated hs-crp    Fatigue     Symptoms of low T    Herpes simplex infection of genitourinary system 8/25/2017    History of kidney stones     History of seasonal allergies     Hypercholesteremia     Hyperlipidemia 4/27/2015    Lateral epicondylitis     Mild concentric left ventricular hypertrophy (LVH)     Obesity     Vitamin D deficiency 4/27/2015        Past Surgical History:   Procedure Laterality Date    COLONOSCOPY  2013    no polyps    HERNIA REPAIR  05/2012    Bilateral inguinal hernia        Family History   Problem Relation Age of Onset    Hypertension Sister     Hypertension Sister     Hypertension Mother     Lung Disease Father         Social History     Socioeconomic History    Marital status:      Spouse name: None    Number of children: None    Years of education: None    Highest education level: None   Tobacco Use    Smoking status: Never    Smokeless tobacco: Never   Substance and Sexual Activity    Alcohol use: No     Alcohol/week: 0.0 standard drinks    Drug use: No     Social Determinants of Health     Financial Resource Strain: Low Risk     Difficulty of Paying Living Expenses: Not hard at all   Food Insecurity: No Food Insecurity    Worried About Running Out of Food in the Last Year: Never true    Ran Out of Food in the Last Year: Never true   Transportation Needs: Unknown    Lack of Transportation (Non-Medical): No   Housing

## 2023-07-20 NOTE — ED TRIAGE NOTES
Pt states that he had a fall on Sunday and was seen at the ED. States that he conitnues to have severe pain his upper left leg. Describes the pain as sharp and stabbing.   Concerned that he may have a blood clot in his leg

## 2023-07-20 NOTE — TELEPHONE ENCOUNTER
Went to the hospital yesterday and nothing broken but he can barely walk on his leg  His thigh is very painful. He thinks he needs a referral somwhere else.

## 2023-07-21 ENCOUNTER — TELEPHONE (OUTPATIENT)
Dept: FAMILY MEDICINE CLINIC | Facility: CLINIC | Age: 63
End: 2023-07-21

## 2023-07-21 PROBLEM — M79.652 LEFT THIGH PAIN: Status: ACTIVE | Noted: 2023-07-21

## 2023-07-21 NOTE — TELEPHONE ENCOUNTER
Patient called to advise he had to go to the ER for his leg pain last night and he was advised that it was a muscle spasm. He says he is unable to return to work right now and says he may not be able to go next week. He is requesting a work note to be excused for next week. Please advise if this is approved.

## 2023-07-21 NOTE — DISCHARGE INSTRUCTIONS
Your CT scan was unremarkable and rules out any fracture. As I suggested for this musculoskeletal pain. You need to continue Tylenol and ibuprofen as needed for pain. Please follow-up with your primary care physician.

## 2023-07-21 NOTE — TELEPHONE ENCOUNTER
Called and notified pt, he did not want to make another appointment and advised he would go back to the hospital.

## 2023-07-31 ENCOUNTER — TELEPHONE (OUTPATIENT)
Dept: FAMILY MEDICINE CLINIC | Facility: CLINIC | Age: 63
End: 2023-07-31

## 2023-08-24 PROBLEM — K30 NON-ULCER DYSPEPSIA: Status: ACTIVE | Noted: 2023-08-24

## 2023-08-24 PROBLEM — K63.5 HYPERPLASTIC POLYP OF ASCENDING COLON: Status: ACTIVE | Noted: 2018-11-14

## 2024-01-16 ENCOUNTER — OFFICE VISIT (OUTPATIENT)
Dept: FAMILY MEDICINE CLINIC | Facility: CLINIC | Age: 64
End: 2024-01-16
Payer: COMMERCIAL

## 2024-01-16 VITALS
HEART RATE: 71 BPM | RESPIRATION RATE: 16 BRPM | DIASTOLIC BLOOD PRESSURE: 76 MMHG | TEMPERATURE: 97.2 F | WEIGHT: 256 LBS | BODY MASS INDEX: 35.84 KG/M2 | SYSTOLIC BLOOD PRESSURE: 125 MMHG | HEIGHT: 71 IN | OXYGEN SATURATION: 99 %

## 2024-01-16 DIAGNOSIS — D50.9 HYPOCHROMIC ANEMIA: ICD-10-CM

## 2024-01-16 DIAGNOSIS — N40.0 BENIGN PROSTATIC HYPERPLASIA WITHOUT LOWER URINARY TRACT SYMPTOMS: ICD-10-CM

## 2024-01-16 DIAGNOSIS — D70.9 NEUTROPENIA, UNSPECIFIED TYPE (HCC): ICD-10-CM

## 2024-01-16 DIAGNOSIS — E66.01 MORBID OBESITY (HCC): ICD-10-CM

## 2024-01-16 DIAGNOSIS — I10 PRIMARY HYPERTENSION: Primary | ICD-10-CM

## 2024-01-16 DIAGNOSIS — E78.00 PURE HYPERCHOLESTEROLEMIA: ICD-10-CM

## 2024-01-16 LAB
ALBUMIN, URINE, POC: 10 MG/L
BASOPHILS # BLD: 0 K/UL (ref 0–0.2)
BASOPHILS NFR BLD: 0 % (ref 0–2)
CREATININE, URINE, POC: 200 MG/DL
DIFFERENTIAL METHOD BLD: ABNORMAL
EOSINOPHIL # BLD: 0.3 K/UL (ref 0–0.8)
EOSINOPHIL NFR BLD: 9 % (ref 0.5–7.8)
ERYTHROCYTE [DISTWIDTH] IN BLOOD BY AUTOMATED COUNT: 15.6 % (ref 11.9–14.6)
HCT VFR BLD AUTO: 40.4 % (ref 41.1–50.3)
HGB BLD-MCNC: 12.9 G/DL (ref 13.6–17.2)
IMM GRANULOCYTES # BLD AUTO: 0 K/UL (ref 0–0.5)
IMM GRANULOCYTES NFR BLD AUTO: 0 % (ref 0–5)
LYMPHOCYTES # BLD: 0.7 K/UL (ref 0.5–4.6)
LYMPHOCYTES NFR BLD: 26 % (ref 13–44)
MCH RBC QN AUTO: 26.9 PG (ref 26.1–32.9)
MCHC RBC AUTO-ENTMCNC: 31.9 G/DL (ref 31.4–35)
MCV RBC AUTO: 84.2 FL (ref 82–102)
MICROALB/CREAT RATIO, POC: <30 MG/G
MONOCYTES # BLD: 0.4 K/UL (ref 0.1–1.3)
MONOCYTES NFR BLD: 13 % (ref 4–12)
NEUTS SEG # BLD: 1.5 K/UL (ref 1.7–8.2)
NEUTS SEG NFR BLD: 52 % (ref 43–78)
NRBC # BLD: 0 K/UL (ref 0–0.2)
PLATELET # BLD AUTO: 281 K/UL (ref 150–450)
PMV BLD AUTO: 12.5 FL (ref 9.4–12.3)
RBC # BLD AUTO: 4.8 M/UL (ref 4.23–5.6)
WBC # BLD AUTO: 2.9 K/UL (ref 4.3–11.1)

## 2024-01-16 PROCEDURE — 82044 UR ALBUMIN SEMIQUANTITATIVE: CPT | Performed by: FAMILY MEDICINE

## 2024-01-16 PROCEDURE — 99214 OFFICE O/P EST MOD 30 MIN: CPT | Performed by: FAMILY MEDICINE

## 2024-01-16 PROCEDURE — 3074F SYST BP LT 130 MM HG: CPT | Performed by: FAMILY MEDICINE

## 2024-01-16 PROCEDURE — 3078F DIAST BP <80 MM HG: CPT | Performed by: FAMILY MEDICINE

## 2024-01-16 RX ORDER — AMLODIPINE BESYLATE AND BENAZEPRIL HYDROCHLORIDE 5; 20 MG/1; MG/1
1 CAPSULE ORAL DAILY
Qty: 90 CAPSULE | Refills: 1 | Status: SHIPPED | OUTPATIENT
Start: 2024-01-16

## 2024-01-16 RX ORDER — TADALAFIL 5 MG/1
5 TABLET ORAL DAILY
Qty: 90 TABLET | Refills: 1 | Status: SHIPPED | OUTPATIENT
Start: 2024-01-16

## 2024-01-16 RX ORDER — ATORVASTATIN CALCIUM 20 MG/1
20 TABLET, FILM COATED ORAL DAILY
Qty: 90 TABLET | Refills: 1 | Status: SHIPPED | OUTPATIENT
Start: 2024-01-16

## 2024-01-16 RX ORDER — TAMSULOSIN HYDROCHLORIDE 0.4 MG/1
0.4 CAPSULE ORAL DAILY
Qty: 90 CAPSULE | Refills: 1 | Status: SHIPPED | OUTPATIENT
Start: 2024-01-16

## 2024-01-16 ASSESSMENT — PATIENT HEALTH QUESTIONNAIRE - PHQ9
2. FEELING DOWN, DEPRESSED OR HOPELESS: 0
SUM OF ALL RESPONSES TO PHQ QUESTIONS 1-9: 0
SUM OF ALL RESPONSES TO PHQ9 QUESTIONS 1 & 2: 0
1. LITTLE INTEREST OR PLEASURE IN DOING THINGS: 0
SUM OF ALL RESPONSES TO PHQ QUESTIONS 1-9: 0

## 2024-01-16 NOTE — PROGRESS NOTES
tablet 1     No current facility-administered medications for this visit.     Lab Results   Component Value Date/Time     01/16/2024 08:29 AM    K 4.1 01/16/2024 08:29 AM     01/16/2024 08:29 AM    CO2 26 01/16/2024 08:29 AM    BUN 17 01/16/2024 08:29 AM    CREATININE 1.20 01/16/2024 08:29 AM    GLUCOSE 99 01/16/2024 08:29 AM    CALCIUM 10.2 01/16/2024 08:29 AM        Objective:  Blood pressure 125/76, pulse 71, temperature 97.2 °F (36.2 °C), temperature source Temporal, resp. rate 16, height 1.803 m (5' 11\"), weight 116.1 kg (256 lb), SpO2 99 %.  Body mass index is 35.7 kg/m².  BP Readings from Last 3 Encounters:   01/16/24 125/76   07/20/23 123/74   07/17/23 120/86     General- Pleasant and no distress  Psych- alert and oriented to person, place and time  Mood and affect are appropriate to situation  Musculoskeletal - Gait and station examination reveals mid-position with no abnormalities.  Neurological- grossly intact.   rrr s mrg.   bcta  extremeties are without edema, and dp, and pt pulses are intact  No carotid bruits   Fundoscopic exam is: benign without retinopathology     Assessment:  1. Primary hypertension    2. Neutropenia, unspecified type (HCC)    3. Morbid obesity (HCC)    4. Pure hypercholesterolemia    5. Hypochromic anemia    6. Benign prostatic hyperplasia without lower urinary tract symptoms        Plan:   Prescription drug management occurs today as follows:  Because current regimen for blood pressure is now working and tolerated, will continue medications as listed    Alhaji has been given the following recommendations today due to his elevated BP reading: lab tests ordered.    Personal instruction is given.  For your information, consider the following:  [employer] is an excellent site that will give you a list of approved blood pressure devices  Targetbp.org is an excellent site that will teach you how to take accurate bp measurements at home.  Significant weight loss can 
drop of 5-10mm blood pressure.  A DASH diet (see bookstore or go to www.[employer] and print DASH diet) will lower 8-14mm blood pressure.  Salt restriction will lower your bp 2-8mm.  Lowering alcohol consumption to moderate use will lower your pressure 2-4mm. Continue efforts to maintain an exercise regimen.    Normal blood pressure target is now 120/80.   Stage 1 hypertension is 130-139/80-89. We sometimes begin treatment with medications but prefer a trial of 3-6 months with diet, exercise, smoking cessation etc.  Stage 2 is >140/90 which is when we always begin treatment with medications and our goal is to get bp at or below 130/80    1. Primary hypertension  -     AMB POC URINE, MICROALBUMIN, SEMIQUANT (3 RESULTS)  -     Basic Metabolic Panel; Future  -     amLODIPine-benazepril (LOTREL) 5-20 MG per capsule; Take 1 capsule by mouth daily, Disp-90 capsule, R-1Normal  2. Neutropenia, unspecified type (HCC)  Assessment & Plan:   Monitored by specialist- no acute findings meriting change in the plan  3. Morbid obesity (HCC)  4. Pure hypercholesterolemia  -     Lipid Panel; Future  -     atorvastatin (LIPITOR) 20 MG tablet; Take 1 tablet by mouth daily, Disp-90 tablet, R-1Normal  5. Hypochromic anemia  -     CBC with Auto Differential; Future  -     Ferritin; Future  -     Iron; Future  6. Benign prostatic hyperplasia without lower urinary tract symptoms  -     tamsulosin (FLOMAX) 0.4 MG capsule; Take 1 capsule by mouth daily, Disp-90 capsule, R-1Normal      Followup:  Return for 6 mo for bp recheck.

## 2024-01-17 LAB
ANION GAP SERPL CALC-SCNC: 5 MMOL/L (ref 2–11)
BUN SERPL-MCNC: 17 MG/DL (ref 8–23)
CALCIUM SERPL-MCNC: 10.2 MG/DL (ref 8.3–10.4)
CHLORIDE SERPL-SCNC: 108 MMOL/L (ref 103–113)
CHOLEST SERPL-MCNC: 135 MG/DL
CO2 SERPL-SCNC: 26 MMOL/L (ref 21–32)
CREAT SERPL-MCNC: 1.2 MG/DL (ref 0.8–1.5)
FERRITIN SERPL-MCNC: 61 NG/ML (ref 8–388)
GLUCOSE SERPL-MCNC: 99 MG/DL (ref 65–100)
HDLC SERPL-MCNC: 56 MG/DL (ref 40–60)
HDLC SERPL: 2.4
IRON SERPL-MCNC: 65 UG/DL (ref 35–150)
LDLC SERPL CALC-MCNC: 64 MG/DL
POTASSIUM SERPL-SCNC: 4.1 MMOL/L (ref 3.5–5.1)
SODIUM SERPL-SCNC: 139 MMOL/L (ref 136–146)
TRIGL SERPL-MCNC: 75 MG/DL (ref 35–150)
VLDLC SERPL CALC-MCNC: 15 MG/DL (ref 6–23)

## 2024-04-04 ENCOUNTER — OFFICE VISIT (OUTPATIENT)
Dept: UROLOGY | Age: 64
End: 2024-04-04
Payer: COMMERCIAL

## 2024-04-04 DIAGNOSIS — R97.20 ELEVATED PROSTATE SPECIFIC ANTIGEN (PSA): Primary | ICD-10-CM

## 2024-04-04 DIAGNOSIS — N13.8 BPH WITH OBSTRUCTION/LOWER URINARY TRACT SYMPTOMS: ICD-10-CM

## 2024-04-04 DIAGNOSIS — N40.1 BPH WITH OBSTRUCTION/LOWER URINARY TRACT SYMPTOMS: ICD-10-CM

## 2024-04-04 LAB
BILIRUBIN, URINE, POC: NEGATIVE
BLOOD URINE, POC: NEGATIVE
GLUCOSE URINE, POC: NEGATIVE
KETONES, URINE, POC: NEGATIVE
LEUKOCYTE ESTERASE, URINE, POC: NEGATIVE
NITRITE, URINE, POC: NEGATIVE
PH, URINE, POC: 7 (ref 4.6–8)
PROTEIN,URINE, POC: NEGATIVE
SPECIFIC GRAVITY, URINE, POC: 1.02 (ref 1–1.03)
URINALYSIS CLARITY, POC: NORMAL
URINALYSIS COLOR, POC: NORMAL
UROBILINOGEN, POC: NORMAL

## 2024-04-04 PROCEDURE — 99213 OFFICE O/P EST LOW 20 MIN: CPT | Performed by: UROLOGY

## 2024-04-04 PROCEDURE — 81003 URINALYSIS AUTO W/O SCOPE: CPT | Performed by: UROLOGY

## 2024-04-04 RX ORDER — TAMSULOSIN HYDROCHLORIDE 0.4 MG/1
0.4 CAPSULE ORAL DAILY
Qty: 90 CAPSULE | Refills: 1 | Status: SHIPPED | OUTPATIENT
Start: 2024-04-04

## 2024-04-04 RX ORDER — TADALAFIL 20 MG/1
20 TABLET ORAL DAILY PRN
Qty: 30 TABLET | Refills: 1 | Status: SHIPPED | OUTPATIENT
Start: 2024-04-04

## 2024-04-04 RX ORDER — CYCLOBENZAPRINE HCL 10 MG
10 TABLET ORAL 3 TIMES DAILY PRN
Qty: 21 TABLET | Refills: 2 | Status: SHIPPED | OUTPATIENT
Start: 2024-04-04 | End: 2024-04-25

## 2024-04-04 ASSESSMENT — ENCOUNTER SYMPTOMS: BACK PAIN: 0

## 2024-04-04 NOTE — PROGRESS NOTES
[facility]  [address] [place] [id number]  [phone]          Daniel Mane  : 1960    Chief Complaint   Patient presents with    Follow-up          HPI     Daniel Mane is a 63 y.o. male    PSA 10.2022 (free PSA 16.7%)   PSA 7.3 2020  PSA 7.2020  PSA 4.2019  PSA 5.2018  PSA 5.2017  PSA 5.2016  PSA 4.2016    Patient is status post 2 negative biopsies, the last 1 having been in 2022.  MRI pelvis in 2020 showed no evidence of malignancy.  Prostate volume was about 90 g.  He is doing well from a voiding standpoint and currently takes both Flomax and Cialis daily.  He denies significant problems with erectile dysfunction.    Past Medical History:   Diagnosis Date    Benign essential hypertension     BPH (benign prostatic hypertrophy) 2015; 2013; 2014    COVID-19 2021    no hospitalization: only symptom \"dizziness\"    CRP elevated     Elevated hs-crp    Fatigue     Symptoms of low T    Herpes simplex infection of genitourinary system 2017    History of kidney stones     History of seasonal allergies     Hypercholesteremia     Hyperlipidemia 2015    Lateral epicondylitis     Mild concentric left ventricular hypertrophy (LVH)     Obesity     Vitamin D deficiency 2015     Past Surgical History:   Procedure Laterality Date    COLONOSCOPY      no polyps    HERNIA REPAIR  2012    Bilateral inguinal hernia     Current Outpatient Medications   Medication Sig Dispense Refill    tadalafil (CIALIS) 20 MG tablet Take 1 tablet by mouth daily as needed for Erectile Dysfunction 30 tablet 1    tamsulosin (FLOMAX) 0.4 MG capsule Take 1 capsule by mouth daily 90 capsule 1    cyclobenzaprine (FLEXERIL) 10 MG tablet Take 1 tablet by mouth 3 times daily as needed for Muscle spasms 21 tablet 2    amLODIPine-benazepril (LOTREL) 5-20

## 2024-04-06 LAB — PSA SERPL DL<=0.01 NG/ML-MCNC: 11.3 NG/ML (ref 0–4)

## 2024-07-16 ENCOUNTER — OFFICE VISIT (OUTPATIENT)
Dept: FAMILY MEDICINE CLINIC | Facility: CLINIC | Age: 64
End: 2024-07-16
Payer: COMMERCIAL

## 2024-07-16 VITALS
HEIGHT: 71 IN | OXYGEN SATURATION: 97 % | BODY MASS INDEX: 36.12 KG/M2 | SYSTOLIC BLOOD PRESSURE: 114 MMHG | RESPIRATION RATE: 16 BRPM | DIASTOLIC BLOOD PRESSURE: 73 MMHG | TEMPERATURE: 97.3 F | HEART RATE: 76 BPM | WEIGHT: 258 LBS

## 2024-07-16 DIAGNOSIS — N52.01 ERECTILE DYSFUNCTION DUE TO ARTERIAL INSUFFICIENCY: ICD-10-CM

## 2024-07-16 DIAGNOSIS — E78.00 PURE HYPERCHOLESTEROLEMIA: ICD-10-CM

## 2024-07-16 DIAGNOSIS — N40.0 BENIGN PROSTATIC HYPERPLASIA WITHOUT LOWER URINARY TRACT SYMPTOMS: ICD-10-CM

## 2024-07-16 DIAGNOSIS — I10 PRIMARY HYPERTENSION: Primary | ICD-10-CM

## 2024-07-16 DIAGNOSIS — E66.01 MORBID OBESITY (HCC): ICD-10-CM

## 2024-07-16 LAB
ANION GAP SERPL CALC-SCNC: 9 MMOL/L (ref 9–18)
BUN SERPL-MCNC: 13 MG/DL (ref 8–23)
CALCIUM SERPL-MCNC: 9.5 MG/DL (ref 8.8–10.2)
CHLORIDE SERPL-SCNC: 105 MMOL/L (ref 98–107)
CO2 SERPL-SCNC: 27 MMOL/L (ref 20–28)
CREAT SERPL-MCNC: 1.22 MG/DL (ref 0.8–1.3)
GLUCOSE SERPL-MCNC: 123 MG/DL (ref 70–99)
POTASSIUM SERPL-SCNC: 4.1 MMOL/L (ref 3.5–5.1)
SODIUM SERPL-SCNC: 140 MMOL/L (ref 136–145)

## 2024-07-16 PROCEDURE — 3074F SYST BP LT 130 MM HG: CPT | Performed by: FAMILY MEDICINE

## 2024-07-16 PROCEDURE — 3078F DIAST BP <80 MM HG: CPT | Performed by: FAMILY MEDICINE

## 2024-07-16 PROCEDURE — 99214 OFFICE O/P EST MOD 30 MIN: CPT | Performed by: FAMILY MEDICINE

## 2024-07-16 RX ORDER — AMLODIPINE BESYLATE AND BENAZEPRIL HYDROCHLORIDE 5; 20 MG/1; MG/1
1 CAPSULE ORAL DAILY
Qty: 90 CAPSULE | Refills: 1 | Status: SHIPPED | OUTPATIENT
Start: 2024-07-16

## 2024-07-16 RX ORDER — VALACYCLOVIR HYDROCHLORIDE 1 G/1
1000 TABLET, FILM COATED ORAL 2 TIMES DAILY
Qty: 180 TABLET | Refills: 1 | Status: SHIPPED | OUTPATIENT
Start: 2024-07-16

## 2024-07-16 RX ORDER — TADALAFIL 5 MG/1
5 TABLET ORAL DAILY
Qty: 90 TABLET | Refills: 1 | Status: SHIPPED | OUTPATIENT
Start: 2024-07-16

## 2024-07-16 RX ORDER — CYCLOBENZAPRINE HCL 10 MG
10 TABLET ORAL 2 TIMES DAILY PRN
Qty: 90 TABLET | Refills: 1 | Status: SHIPPED | OUTPATIENT
Start: 2024-07-16

## 2024-07-16 RX ORDER — ATORVASTATIN CALCIUM 20 MG/1
20 TABLET, FILM COATED ORAL DAILY
Qty: 90 TABLET | Refills: 1 | Status: SHIPPED | OUTPATIENT
Start: 2024-07-16

## 2024-07-16 SDOH — ECONOMIC STABILITY: INCOME INSECURITY: HOW HARD IS IT FOR YOU TO PAY FOR THE VERY BASICS LIKE FOOD, HOUSING, MEDICAL CARE, AND HEATING?: NOT VERY HARD

## 2024-07-16 SDOH — ECONOMIC STABILITY: FOOD INSECURITY: WITHIN THE PAST 12 MONTHS, THE FOOD YOU BOUGHT JUST DIDN'T LAST AND YOU DIDN'T HAVE MONEY TO GET MORE.: NEVER TRUE

## 2024-07-16 SDOH — ECONOMIC STABILITY: FOOD INSECURITY: WITHIN THE PAST 12 MONTHS, YOU WORRIED THAT YOUR FOOD WOULD RUN OUT BEFORE YOU GOT MONEY TO BUY MORE.: NEVER TRUE

## 2024-07-16 NOTE — PROGRESS NOTES
Subjective:  Daniel Mane is a 63 y.o. male presents today for their semi-annual htn visit. They are having no side effects and are doing well.  Systems review of cardiovascular and pulmonary systems reveal no complaints or pertinent positives.  Patient denies any pounding heart beats or rapid heart beat intervals, flushing, panic like attacks, headaches, dizzyness or flushing.  They have drug reistant hypertension, on 3 or more different medicaitons including one diuretic- No    No data recorded    If you don't have a home bp machine, you should purchase one at home and begin to check your pressure at home on a regular basis.  Your blood pressure goal is listed under the \"plan section\" below    No Known Allergies   reports that he has never smoked. He has never been exposed to tobacco smoke. He has never used smokeless tobacco.  Current Outpatient Medications   Medication Sig Dispense Refill    amLODIPine-benazepril (LOTREL) 5-20 MG per capsule Take 1 capsule by mouth daily 90 capsule 1    atorvastatin (LIPITOR) 20 MG tablet Take 1 tablet by mouth daily 90 tablet 1    cyclobenzaprine (FLEXERIL) 10 MG tablet Take 1 tablet by mouth 2 times daily as needed for Muscle spasms 90 tablet 1    tadalafil (CIALIS) 5 MG tablet Take 1 tablet by mouth daily 90 tablet 1    valACYclovir (VALTREX) 1 g tablet Take 1 tablet by mouth 2 times daily 180 tablet 1    tamsulosin (FLOMAX) 0.4 MG capsule Take 1 capsule by mouth daily 90 capsule 1    Cholecalciferol (VITAMIN D) 10 MCG (400 UNIT) CAPS Capsule Take 1 tablet by mouth daily       No current facility-administered medications for this visit.       Lab Results   Component Value Date/Time     01/16/2024 08:29 AM    K 4.1 01/16/2024 08:29 AM     01/16/2024 08:29 AM    CO2 26 01/16/2024 08:29 AM    BUN 17 01/16/2024 08:29 AM    CREATININE 1.20 01/16/2024 08:29 AM    GLUCOSE 99 01/16/2024 08:29 AM    CALCIUM 10.2 01/16/2024 08:29 AM          Objective:  Blood

## 2024-08-12 RX ORDER — TAMSULOSIN HYDROCHLORIDE 0.4 MG/1
0.4 CAPSULE ORAL DAILY
Qty: 90 CAPSULE | Refills: 1 | Status: SHIPPED | OUTPATIENT
Start: 2024-08-12

## 2024-08-12 NOTE — TELEPHONE ENCOUNTER
Patient called requesting refill for:     tamsulosin (FLOMAX) 0.4 MG capsule     States that he only has 5 pills left and no refills left.    [id number] - [place] - [address] [facility] - P [phone] - F [phone]  [address] [address], [place] [address]  Phone: [phone]  Fax: [phone]         Please advise     LOV-7/16/2024 NOV-1/16/2025

## 2024-09-20 ENCOUNTER — OFFICE VISIT (OUTPATIENT)
Dept: FAMILY MEDICINE CLINIC | Facility: CLINIC | Age: 64
End: 2024-09-20
Payer: COMMERCIAL

## 2024-09-20 VITALS
WEIGHT: 260.6 LBS | OXYGEN SATURATION: 100 % | HEART RATE: 80 BPM | BODY MASS INDEX: 36.48 KG/M2 | HEIGHT: 71 IN | TEMPERATURE: 97.7 F | RESPIRATION RATE: 16 BRPM | DIASTOLIC BLOOD PRESSURE: 80 MMHG | SYSTOLIC BLOOD PRESSURE: 130 MMHG

## 2024-09-20 DIAGNOSIS — R07.89 CHEST DISCOMFORT: ICD-10-CM

## 2024-09-20 DIAGNOSIS — N40.0 BENIGN PROSTATIC HYPERPLASIA WITHOUT LOWER URINARY TRACT SYMPTOMS: ICD-10-CM

## 2024-09-20 DIAGNOSIS — I10 PRIMARY HYPERTENSION: Primary | ICD-10-CM

## 2024-09-20 PROCEDURE — 99213 OFFICE O/P EST LOW 20 MIN: CPT | Performed by: FAMILY MEDICINE

## 2024-09-20 PROCEDURE — 3079F DIAST BP 80-89 MM HG: CPT | Performed by: FAMILY MEDICINE

## 2024-09-20 PROCEDURE — 3075F SYST BP GE 130 - 139MM HG: CPT | Performed by: FAMILY MEDICINE

## 2024-09-20 RX ORDER — HYDROCHLOROTHIAZIDE 12.5 MG/1
12.5 CAPSULE ORAL EVERY EVENING
Qty: 90 CAPSULE | Refills: 1 | Status: SHIPPED | OUTPATIENT
Start: 2024-09-20

## 2024-09-20 RX ORDER — TAMSULOSIN HYDROCHLORIDE 0.4 MG/1
0.4 CAPSULE ORAL DAILY
Qty: 90 CAPSULE | Refills: 1 | Status: SHIPPED | OUTPATIENT
Start: 2024-09-20

## 2024-10-22 DIAGNOSIS — N52.01 ERECTILE DYSFUNCTION DUE TO ARTERIAL INSUFFICIENCY: ICD-10-CM

## 2024-10-22 DIAGNOSIS — N40.0 BENIGN PROSTATIC HYPERPLASIA WITHOUT LOWER URINARY TRACT SYMPTOMS: ICD-10-CM

## 2024-10-22 RX ORDER — TADALAFIL 5 MG/1
5 TABLET ORAL DAILY
Qty: 90 TABLET | Refills: 1 | Status: SHIPPED | OUTPATIENT
Start: 2024-10-22

## 2024-10-22 NOTE — TELEPHONE ENCOUNTER
Patient called requesting a refill for:    tadalafil (CIALIS) 5 MG tablet       Preferred pharmacy:    [id number] - [place] - [address] - P [phone] - F [phone]    Please advise    LOV 9/20/2024  NOV 1/16/2025

## 2025-01-16 ENCOUNTER — OFFICE VISIT (OUTPATIENT)
Dept: FAMILY MEDICINE CLINIC | Facility: CLINIC | Age: 65
End: 2025-01-16
Payer: COMMERCIAL

## 2025-01-16 VITALS
HEIGHT: 71 IN | WEIGHT: 274 LBS | DIASTOLIC BLOOD PRESSURE: 70 MMHG | TEMPERATURE: 97.7 F | OXYGEN SATURATION: 98 % | SYSTOLIC BLOOD PRESSURE: 109 MMHG | HEART RATE: 78 BPM | BODY MASS INDEX: 38.36 KG/M2 | RESPIRATION RATE: 18 BRPM

## 2025-01-16 DIAGNOSIS — E66.01 MORBID OBESITY: ICD-10-CM

## 2025-01-16 DIAGNOSIS — M54.31 SCIATICA OF RIGHT SIDE: ICD-10-CM

## 2025-01-16 DIAGNOSIS — E78.00 PURE HYPERCHOLESTEROLEMIA: ICD-10-CM

## 2025-01-16 DIAGNOSIS — I10 PRIMARY HYPERTENSION: Primary | ICD-10-CM

## 2025-01-16 DIAGNOSIS — N40.0 BENIGN PROSTATIC HYPERPLASIA WITHOUT LOWER URINARY TRACT SYMPTOMS: ICD-10-CM

## 2025-01-16 DIAGNOSIS — N52.01 ERECTILE DYSFUNCTION DUE TO ARTERIAL INSUFFICIENCY: ICD-10-CM

## 2025-01-16 LAB
ANION GAP SERPL CALC-SCNC: 11 MMOL/L (ref 7–16)
BUN SERPL-MCNC: 13 MG/DL (ref 8–23)
CALCIUM SERPL-MCNC: 9.9 MG/DL (ref 8.8–10.2)
CHLORIDE SERPL-SCNC: 101 MMOL/L (ref 98–107)
CHOLEST SERPL-MCNC: 121 MG/DL (ref 0–200)
CO2 SERPL-SCNC: 27 MMOL/L (ref 20–29)
CREAT SERPL-MCNC: 1.19 MG/DL (ref 0.8–1.3)
EST. AVERAGE GLUCOSE BLD GHB EST-MCNC: 95 MG/DL
GLUCOSE SERPL-MCNC: 96 MG/DL (ref 70–99)
HBA1C MFR BLD: 4.9 % (ref 0–5.6)
HDLC SERPL-MCNC: 56 MG/DL (ref 40–60)
HDLC SERPL: 2.2 (ref 0–5)
LDLC SERPL CALC-MCNC: 52 MG/DL (ref 0–100)
POTASSIUM SERPL-SCNC: 4.2 MMOL/L (ref 3.5–5.1)
SODIUM SERPL-SCNC: 138 MMOL/L (ref 136–145)
TRIGL SERPL-MCNC: 65 MG/DL (ref 0–150)
VLDLC SERPL CALC-MCNC: 13 MG/DL (ref 6–23)

## 2025-01-16 PROCEDURE — 3078F DIAST BP <80 MM HG: CPT | Performed by: FAMILY MEDICINE

## 2025-01-16 PROCEDURE — 3074F SYST BP LT 130 MM HG: CPT | Performed by: FAMILY MEDICINE

## 2025-01-16 PROCEDURE — 99214 OFFICE O/P EST MOD 30 MIN: CPT | Performed by: FAMILY MEDICINE

## 2025-01-16 RX ORDER — PREDNISONE 10 MG/1
TABLET ORAL
Qty: 16 TABLET | Refills: 0 | Status: SHIPPED | OUTPATIENT
Start: 2025-01-16

## 2025-01-16 RX ORDER — CYCLOBENZAPRINE HCL 10 MG
10 TABLET ORAL 2 TIMES DAILY PRN
Qty: 90 TABLET | Refills: 1 | Status: SHIPPED | OUTPATIENT
Start: 2025-01-16

## 2025-01-16 RX ORDER — AMLODIPINE AND BENAZEPRIL HYDROCHLORIDE 5; 20 MG/1; MG/1
1 CAPSULE ORAL DAILY
Qty: 90 CAPSULE | Refills: 1 | Status: SHIPPED | OUTPATIENT
Start: 2025-01-16

## 2025-01-16 RX ORDER — HYDROCHLOROTHIAZIDE 12.5 MG/1
12.5 CAPSULE ORAL EVERY EVENING
Qty: 90 CAPSULE | Refills: 1 | Status: SHIPPED | OUTPATIENT
Start: 2025-01-16

## 2025-01-16 RX ORDER — TAMSULOSIN HYDROCHLORIDE 0.4 MG/1
0.4 CAPSULE ORAL DAILY
Qty: 90 CAPSULE | Refills: 1 | Status: SHIPPED | OUTPATIENT
Start: 2025-01-16

## 2025-01-16 RX ORDER — TADALAFIL 5 MG/1
5 TABLET ORAL DAILY
Qty: 90 TABLET | Refills: 1 | Status: SHIPPED | OUTPATIENT
Start: 2025-01-16

## 2025-01-16 RX ORDER — VALACYCLOVIR HYDROCHLORIDE 1 G/1
1000 TABLET, FILM COATED ORAL 2 TIMES DAILY
Qty: 180 TABLET | Refills: 1 | Status: SHIPPED | OUTPATIENT
Start: 2025-01-16

## 2025-01-16 RX ORDER — ATORVASTATIN CALCIUM 20 MG/1
20 TABLET, FILM COATED ORAL DAILY
Qty: 90 TABLET | Refills: 1 | Status: SHIPPED | OUTPATIENT
Start: 2025-01-16

## 2025-01-16 SDOH — ECONOMIC STABILITY: FOOD INSECURITY: WITHIN THE PAST 12 MONTHS, THE FOOD YOU BOUGHT JUST DIDN'T LAST AND YOU DIDN'T HAVE MONEY TO GET MORE.: NEVER TRUE

## 2025-01-16 SDOH — ECONOMIC STABILITY: FOOD INSECURITY: WITHIN THE PAST 12 MONTHS, YOU WORRIED THAT YOUR FOOD WOULD RUN OUT BEFORE YOU GOT MONEY TO BUY MORE.: NEVER TRUE

## 2025-01-16 ASSESSMENT — PATIENT HEALTH QUESTIONNAIRE - PHQ9
1. LITTLE INTEREST OR PLEASURE IN DOING THINGS: NOT AT ALL
SUM OF ALL RESPONSES TO PHQ9 QUESTIONS 1 & 2: 0
SUM OF ALL RESPONSES TO PHQ QUESTIONS 1-9: 0
2. FEELING DOWN, DEPRESSED OR HOPELESS: NOT AT ALL
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
## 2025-03-31 ENCOUNTER — OFFICE VISIT (OUTPATIENT)
Dept: FAMILY MEDICINE CLINIC | Facility: CLINIC | Age: 65
End: 2025-03-31
Payer: COMMERCIAL

## 2025-03-31 VITALS
HEART RATE: 94 BPM | SYSTOLIC BLOOD PRESSURE: 120 MMHG | OXYGEN SATURATION: 98 % | DIASTOLIC BLOOD PRESSURE: 68 MMHG | WEIGHT: 281 LBS | BODY MASS INDEX: 39.34 KG/M2 | RESPIRATION RATE: 18 BRPM | HEIGHT: 71 IN | TEMPERATURE: 97.7 F

## 2025-03-31 DIAGNOSIS — J30.1 SEASONAL ALLERGIC RHINITIS DUE TO POLLEN: ICD-10-CM

## 2025-03-31 DIAGNOSIS — R53.83 OTHER FATIGUE: ICD-10-CM

## 2025-03-31 DIAGNOSIS — D70.9 NEUTROPENIA, UNSPECIFIED TYPE: ICD-10-CM

## 2025-03-31 DIAGNOSIS — K30 NON-ULCER DYSPEPSIA: ICD-10-CM

## 2025-03-31 DIAGNOSIS — K92.1 MELENA: ICD-10-CM

## 2025-03-31 DIAGNOSIS — E66.01 MORBID OBESITY: ICD-10-CM

## 2025-03-31 DIAGNOSIS — D64.9 ANEMIA, UNSPECIFIED TYPE: Primary | ICD-10-CM

## 2025-03-31 DIAGNOSIS — K63.5 HYPERPLASTIC POLYP OF ASCENDING COLON: ICD-10-CM

## 2025-03-31 DIAGNOSIS — R07.9 EXERTIONAL CHEST PAIN: ICD-10-CM

## 2025-03-31 DIAGNOSIS — R77.1 ELEVATED SERUM GLOBULIN LEVEL: ICD-10-CM

## 2025-03-31 PROCEDURE — 3078F DIAST BP <80 MM HG: CPT | Performed by: FAMILY MEDICINE

## 2025-03-31 PROCEDURE — 99214 OFFICE O/P EST MOD 30 MIN: CPT | Performed by: FAMILY MEDICINE

## 2025-03-31 PROCEDURE — 3074F SYST BP LT 130 MM HG: CPT | Performed by: FAMILY MEDICINE

## 2025-03-31 RX ORDER — FLUTICASONE PROPIONATE 50 MCG
2 SPRAY, SUSPENSION (ML) NASAL DAILY
Qty: 16 G | Refills: 5 | Status: SHIPPED | OUTPATIENT
Start: 2025-03-31

## 2025-03-31 SDOH — ECONOMIC STABILITY: FOOD INSECURITY: WITHIN THE PAST 12 MONTHS, YOU WORRIED THAT YOUR FOOD WOULD RUN OUT BEFORE YOU GOT MONEY TO BUY MORE.: NEVER TRUE

## 2025-03-31 SDOH — ECONOMIC STABILITY: FOOD INSECURITY: WITHIN THE PAST 12 MONTHS, THE FOOD YOU BOUGHT JUST DIDN'T LAST AND YOU DIDN'T HAVE MONEY TO GET MORE.: NEVER TRUE
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
## 2025-04-01 ENCOUNTER — LAB (OUTPATIENT)
Dept: FAMILY MEDICINE CLINIC | Facility: CLINIC | Age: 65
End: 2025-04-01

## 2025-04-01 DIAGNOSIS — D64.9 ANEMIA, UNSPECIFIED TYPE: ICD-10-CM

## 2025-04-01 DIAGNOSIS — K92.1 MELENA: ICD-10-CM

## 2025-04-01 DIAGNOSIS — D70.9 NEUTROPENIA, UNSPECIFIED TYPE: ICD-10-CM

## 2025-04-01 DIAGNOSIS — R53.83 OTHER FATIGUE: ICD-10-CM

## 2025-04-01 DIAGNOSIS — K30 NON-ULCER DYSPEPSIA: ICD-10-CM

## 2025-04-01 LAB
ANION GAP SERPL CALC-SCNC: 11 MMOL/L (ref 7–16)
BUN SERPL-MCNC: 13 MG/DL (ref 8–23)
CALCIUM SERPL-MCNC: 9.2 MG/DL (ref 8.8–10.2)
CHLORIDE SERPL-SCNC: 104 MMOL/L (ref 98–107)
CO2 SERPL-SCNC: 26 MMOL/L (ref 20–29)
CREAT SERPL-MCNC: 1.21 MG/DL (ref 0.8–1.3)
FERRITIN SERPL-MCNC: 14 NG/ML (ref 8–388)
GLUCOSE SERPL-MCNC: 114 MG/DL (ref 70–99)
IRON SATN MFR SERPL: 3 % (ref 20–50)
IRON SERPL-MCNC: 12 UG/DL (ref 35–100)
POTASSIUM SERPL-SCNC: 4.5 MMOL/L (ref 3.5–5.1)
SODIUM SERPL-SCNC: 141 MMOL/L (ref 136–145)
TIBC SERPL-MCNC: 407 UG/DL (ref 240–450)
TSH, 3RD GENERATION: 1.44 UIU/ML (ref 0.27–4.2)
UIBC SERPL-MCNC: 395 UG/DL (ref 112–347)

## 2025-04-02 ENCOUNTER — HOSPITAL ENCOUNTER (EMERGENCY)
Age: 65
Discharge: HOME | End: 2025-04-02
Attending: EMERGENCY MEDICINE
Payer: COMMERCIAL

## 2025-04-02 ENCOUNTER — RESULTS FOLLOW-UP (OUTPATIENT)
Dept: FAMILY MEDICINE CLINIC | Facility: CLINIC | Age: 65
End: 2025-04-02

## 2025-04-02 VITALS
DIASTOLIC BLOOD PRESSURE: 75 MMHG | WEIGHT: 281 LBS | HEART RATE: 74 BPM | HEIGHT: 71 IN | TEMPERATURE: 97.8 F | OXYGEN SATURATION: 100 % | RESPIRATION RATE: 12 BRPM | SYSTOLIC BLOOD PRESSURE: 134 MMHG | BODY MASS INDEX: 39.34 KG/M2

## 2025-04-02 DIAGNOSIS — D64.9 SYMPTOMATIC ANEMIA: Primary | ICD-10-CM

## 2025-04-02 DIAGNOSIS — D50.9 IRON DEFICIENCY ANEMIA, UNSPECIFIED IRON DEFICIENCY ANEMIA TYPE: ICD-10-CM

## 2025-04-02 LAB
ALBUMIN SERPL-MCNC: 3.3 G/DL (ref 3.2–4.6)
ALBUMIN/GLOB SERPL: 0.8 (ref 1–1.9)
ALP SERPL-CCNC: 53 U/L (ref 40–129)
ALT SERPL-CCNC: 30 U/L (ref 8–55)
ANION GAP SERPL CALC-SCNC: 8 MMOL/L (ref 7–16)
AST SERPL-CCNC: 31 U/L (ref 15–37)
BASOPHILS # BLD: 0 K/UL (ref 0–0.2)
BASOPHILS # BLD: 0.02 K/UL (ref 0–0.2)
BASOPHILS NFR BLD: 0 % (ref 0–2)
BASOPHILS NFR BLD: 0.6 % (ref 0–2)
BILIRUB SERPL-MCNC: 0.3 MG/DL (ref 0–1.2)
BUN SERPL-MCNC: 13 MG/DL (ref 8–23)
CALCIUM SERPL-MCNC: 9.2 MG/DL (ref 8.8–10.2)
CHLORIDE SERPL-SCNC: 106 MMOL/L (ref 98–107)
CO2 SERPL-SCNC: 26 MMOL/L (ref 20–29)
CREAT SERPL-MCNC: 1.22 MG/DL (ref 0.8–1.3)
DIFFERENTIAL METHOD BLD: ABNORMAL
DIFFERENTIAL METHOD BLD: ABNORMAL
EKG ATRIAL RATE: 79 BPM
EKG DIAGNOSIS: NORMAL
EKG P AXIS: 33 DEGREES
EKG P-R INTERVAL: 196 MS
EKG Q-T INTERVAL: 355 MS
EKG QRS DURATION: 86 MS
EKG QTC CALCULATION (BAZETT): 405 MS
EKG R AXIS: 27 DEGREES
EKG T AXIS: 46 DEGREES
EKG VENTRICULAR RATE: 78 BPM
EOSINOPHIL # BLD: 0.16 K/UL (ref 0–0.8)
EOSINOPHIL # BLD: 0.18 K/UL (ref 0–0.8)
EOSINOPHIL NFR BLD: 5.1 % (ref 0.5–7.8)
EOSINOPHIL NFR BLD: 5.8 % (ref 0.5–7.8)
ERYTHROCYTE [DISTWIDTH] IN BLOOD BY AUTOMATED COUNT: 18.7 % (ref 11.9–14.6)
ERYTHROCYTE [DISTWIDTH] IN BLOOD BY AUTOMATED COUNT: 19 % (ref 11.9–14.6)
FERRITIN SERPL-MCNC: 13 NG/ML (ref 8–388)
FOLATE SERPL-MCNC: 10.4 NG/ML (ref 3.1–17.5)
GLOBULIN SER CALC-MCNC: 3.9 G/DL (ref 2.3–3.5)
GLUCOSE SERPL-MCNC: 101 MG/DL (ref 70–99)
HCT VFR BLD AUTO: 20.3 % (ref 41.1–50.3)
HCT VFR BLD AUTO: 22.8 % (ref 41.1–50.3)
HCT VFR BLD AUTO: 23.6 % (ref 41.1–50.3)
HGB BLD-MCNC: 5.8 G/DL (ref 13.6–17.2)
HGB BLD-MCNC: 6.4 G/DL (ref 13.6–17.2)
HGB BLD-MCNC: 6.9 G/DL (ref 13.6–17.2)
HISTORY CHECK: NORMAL
HISTORY CHECK: NORMAL
IMM GRANULOCYTES # BLD AUTO: 0 K/UL (ref 0–0.5)
IMM GRANULOCYTES # BLD AUTO: 0.01 K/UL (ref 0–0.5)
IMM GRANULOCYTES NFR BLD AUTO: 0 % (ref 0–5)
IMM GRANULOCYTES NFR BLD AUTO: 0.3 % (ref 0–5)
IRON SERPL-MCNC: 9 UG/DL (ref 35–100)
LYMPHOCYTES # BLD: 0.65 K/UL (ref 0.5–4.6)
LYMPHOCYTES # BLD: 0.79 K/UL (ref 0.5–4.6)
LYMPHOCYTES NFR BLD: 20.9 % (ref 13–44)
LYMPHOCYTES NFR BLD: 25.6 % (ref 13–44)
MCH RBC QN AUTO: 19.9 PG (ref 26.1–32.9)
MCH RBC QN AUTO: 19.9 PG (ref 26.1–32.9)
MCHC RBC AUTO-ENTMCNC: 28.1 G/DL (ref 31.4–35)
MCHC RBC AUTO-ENTMCNC: 28.6 G/DL (ref 31.4–35)
MCV RBC AUTO: 69.5 FL (ref 82–102)
MCV RBC AUTO: 71 FL (ref 82–102)
MONOCYTES # BLD: 0.33 K/UL (ref 0.1–1.3)
MONOCYTES # BLD: 0.37 K/UL (ref 0.1–1.3)
MONOCYTES NFR BLD: 10.7 % (ref 4–12)
MONOCYTES NFR BLD: 11.9 % (ref 4–12)
NEUTS SEG # BLD: 1.77 K/UL (ref 1.7–8.2)
NEUTS SEG # BLD: 1.92 K/UL (ref 1.7–8.2)
NEUTS SEG NFR BLD: 57.3 % (ref 43–78)
NEUTS SEG NFR BLD: 61.8 % (ref 43–78)
NRBC # BLD: 0 K/UL (ref 0–0.2)
NRBC # BLD: 0 K/UL (ref 0–0.2)
PLATELET # BLD AUTO: 368 K/UL (ref 150–450)
PLATELET # BLD AUTO: 369 K/UL (ref 150–450)
PMV BLD AUTO: 10.6 FL (ref 9.4–12.3)
PMV BLD AUTO: 9.9 FL (ref 9.4–12.3)
POTASSIUM SERPL-SCNC: 4.1 MMOL/L (ref 3.5–5.1)
PROT SERPL-MCNC: 7.2 G/DL (ref 6.3–8.2)
RBC # BLD AUTO: 2.92 M/UL (ref 4.23–5.6)
RBC # BLD AUTO: 3.21 M/UL (ref 4.23–5.6)
SODIUM SERPL-SCNC: 140 MMOL/L (ref 136–145)
TESTOST SERPL-MCNC: 399 NG/DL (ref 264–916)
VIT B12 SERPL-MCNC: >4000 PG/ML (ref 193–986)
WBC # BLD AUTO: 3.1 K/UL (ref 4.3–11.1)
WBC # BLD AUTO: 3.1 K/UL (ref 4.3–11.1)

## 2025-04-02 PROCEDURE — 86850 RBC ANTIBODY SCREEN: CPT

## 2025-04-02 PROCEDURE — 82746 ASSAY OF FOLIC ACID SERUM: CPT

## 2025-04-02 PROCEDURE — 85025 COMPLETE CBC W/AUTO DIFF WBC: CPT

## 2025-04-02 PROCEDURE — 86901 BLOOD TYPING SEROLOGIC RH(D): CPT

## 2025-04-02 PROCEDURE — 85014 HEMATOCRIT: CPT

## 2025-04-02 PROCEDURE — 85018 HEMOGLOBIN: CPT

## 2025-04-02 PROCEDURE — 82728 ASSAY OF FERRITIN: CPT

## 2025-04-02 PROCEDURE — 82607 VITAMIN B-12: CPT

## 2025-04-02 PROCEDURE — 86923 COMPATIBILITY TEST ELECTRIC: CPT

## 2025-04-02 PROCEDURE — 93010 ELECTROCARDIOGRAM REPORT: CPT | Performed by: INTERNAL MEDICINE

## 2025-04-02 PROCEDURE — P9016 RBC LEUKOCYTES REDUCED: HCPCS

## 2025-04-02 PROCEDURE — 99285 EMERGENCY DEPT VISIT HI MDM: CPT

## 2025-04-02 PROCEDURE — 80053 COMPREHEN METABOLIC PANEL: CPT

## 2025-04-02 PROCEDURE — 93005 ELECTROCARDIOGRAM TRACING: CPT | Performed by: EMERGENCY MEDICINE

## 2025-04-02 PROCEDURE — 83540 ASSAY OF IRON: CPT

## 2025-04-02 PROCEDURE — 86900 BLOOD TYPING SEROLOGIC ABO: CPT

## 2025-04-02 PROCEDURE — 36430 TRANSFUSION BLD/BLD COMPNT: CPT

## 2025-04-02 RX ORDER — SODIUM CHLORIDE 9 MG/ML
INJECTION, SOLUTION INTRAVENOUS PRN
Status: DISCONTINUED | OUTPATIENT
Start: 2025-04-02 | End: 2025-04-02 | Stop reason: HOSPADM

## 2025-04-02 RX ORDER — FERROUS SULFATE 325(65) MG
325 TABLET ORAL
Qty: 30 TABLET | Refills: 0 | Status: SHIPPED | OUTPATIENT
Start: 2025-04-02

## 2025-04-02 ASSESSMENT — PAIN SCALES - GENERAL: PAINLEVEL_OUTOF10: 0

## 2025-04-02 ASSESSMENT — PAIN - FUNCTIONAL ASSESSMENT: PAIN_FUNCTIONAL_ASSESSMENT: 0-10

## 2025-04-02 NOTE — ED TRIAGE NOTES
Patient had lab work done yesterday. States he was told to come to the ER due to his hemoglobin being low.

## 2025-04-02 NOTE — CARE COORDINATION
SW met with patient who confirmed demographic information. Patient is from home, lives with his wife, and has other family that's local and supportive. Patient is insured and established with primary care. Patient does not use assistive devices to ambulate, is independent in his ADLs, and denies any falls. ER dispo pending. SW to continue to follow for new or additional needs.     Rupinder Roger, [facility] [place]  Medical Social Worker  [facility] ER

## 2025-04-02 NOTE — CONSENT
Informed Consent for Blood Component Transfusion Note    I have discussed with the patient the rationale for blood component transfusion; its benefits in treating or preventing fatigue, organ damage, or death; and its risk which includes mild transfusion reactions, rare risk of blood borne infection, or more serious but rare reactions. I have discussed the alternatives to transfusion, including the risk and consequences of not receiving transfusion. The patient had an opportunity to ask questions and had agreed to proceed with transfusion of blood components.    Electronically signed by Fransisco Henriquez MD on 4/2/25 at 10:08 AM EDT

## 2025-04-02 NOTE — DISCHARGE INSTRUCTIONS
Take iron supplement as prescribed.  Increase your consumption of iron rich foods-see handout.  Internet search can be helpful for iron rich foods as well.  No exercise until cleared by your PCP Dr. Segal.  Call Dr. Segal in 3 days if you have not heard from them regarding your follow-up appointments.  He plans on scheduling capsule endoscopy and iron infusions for you.  Return if any new, worsening or concerning symptoms

## 2025-04-02 NOTE — ED PROVIDER NOTES
HYDROCHLOROTHIAZIDE 12.5 MG CAPSULE    Take 1 capsule by mouth every evening    TADALAFIL (CIALIS) 5 MG TABLET    Take 1 tablet by mouth daily    TAMSULOSIN (FLOMAX) 0.4 MG CAPSULE    Take 1 capsule by mouth daily    VALACYCLOVIR (VALTREX) 1 G TABLET    Take 1 tablet by mouth 2 times daily        Results from this emergency department visit:      Results for orders placed or performed during the hospital encounter of 04/02/25   CBC with Auto Differential   Result Value Ref Range    WBC 3.1 (L) 4.3 - 11.1 K/uL    RBC 2.92 (L) 4.23 - 5.6 M/uL    Hemoglobin 5.8 (LL) 13.6 - 17.2 g/dL    Hematocrit 20.3 (L) 41.1 - 50.3 %    MCV 69.5 (L) 82.0 - 102.0 FL    MCH 19.9 (L) 26.1 - 32.9 PG    MCHC 28.6 (L) 31.4 - 35.0 g/dL    RDW 18.7 (H) 11.9 - 14.6 %    Platelets 368 150 - 450 K/uL    MPV 9.9 9.4 - 12.3 FL    nRBC 0.00 0.0 - 0.2 K/uL    Differential Type AUTOMATED      Neutrophils % 61.8 43.0 - 78.0 %    Lymphocytes % 20.9 13.0 - 44.0 %    Monocytes % 11.9 4.0 - 12.0 %    Eosinophils % 5.1 0.5 - 7.8 %    Basophils % 0.0 0.0 - 2.0 %    Immature Granulocytes % 0.3 0.0 - 5.0 %    Neutrophils Absolute 1.92 1.70 - 8.20 K/UL    Lymphocytes Absolute 0.65 0.50 - 4.60 K/UL    Monocytes Absolute 0.37 0.10 - 1.30 K/UL    Eosinophils Absolute 0.16 0.00 - 0.80 K/UL    Basophils Absolute 0.00 0.00 - 0.20 K/UL    Immature Granulocytes Absolute 0.01 0.0 - 0.5 K/UL   Comprehensive Metabolic Panel   Result Value Ref Range    Sodium 140 136 - 145 mmol/L    Potassium 4.1 3.5 - 5.1 mmol/L    Chloride 106 98 - 107 mmol/L    CO2 26 20 - 29 mmol/L    Anion Gap 8 7 - 16 mmol/L    Glucose 101 (H) 70 - 99 mg/dL    BUN 13 8 - 23 MG/DL    Creatinine 1.22 0.80 - 1.30 MG/DL    Est, Glom Filt Rate 67 >60 ml/min/1.73m2    Calcium 9.2 8.8 - 10.2 MG/DL    Total Bilirubin 0.3 0.0 - 1.2 MG/DL    ALT 30 8 - 55 U/L    AST 31 15 - 37 U/L    Alk Phosphatase 53 40 - 129 U/L    Total Protein 7.2 6.3 - 8.2 g/dL    Albumin 3.3 3.2 - 4.6 g/dL    Globulin 3.9 (H) 2.3 -

## 2025-04-03 LAB
ABO + RH BLD: NORMAL
BLD PROD TYP BPU: NORMAL
BLD PROD TYP BPU: NORMAL
BLOOD BANK BLOOD PRODUCT EXPIRATION DATE: NORMAL
BLOOD BANK BLOOD PRODUCT EXPIRATION DATE: NORMAL
BLOOD BANK CMNT PATIENT-IMP: NORMAL
BLOOD BANK DISPENSE STATUS: NORMAL
BLOOD BANK DISPENSE STATUS: NORMAL
BLOOD BANK ISBT PRODUCT BLOOD TYPE: 1700
BLOOD BANK ISBT PRODUCT BLOOD TYPE: 7300
BLOOD BANK PRODUCT CODE: NORMAL
BLOOD BANK UNIT TYPE AND RH: NORMAL
BLOOD BANK UNIT TYPE AND RH: NORMAL
BLOOD GROUP ANTIBODIES SERPL: NORMAL
BPU ID: NORMAL
BPU ID: NORMAL
CROSSMATCH RESULT: NORMAL
CROSSMATCH RESULT: NORMAL
SPECIMEN EXP DATE BLD: NORMAL
UNIT DIVISION: 0
UNIT DIVISION: 0
UNIT ISSUE DATE/TIME: NORMAL
UNIT ISSUE DATE/TIME: NORMAL

## 2025-04-04 DIAGNOSIS — D50.9 IRON DEFICIENCY ANEMIA, UNSPECIFIED IRON DEFICIENCY ANEMIA TYPE: Primary | ICD-10-CM

## 2025-04-04 RX ORDER — HEPARIN 100 UNIT/ML
500 SYRINGE INTRAVENOUS PRN
OUTPATIENT
Start: 2025-04-04

## 2025-04-04 RX ORDER — SODIUM CHLORIDE 9 MG/ML
INJECTION, SOLUTION INTRAVENOUS CONTINUOUS
OUTPATIENT
Start: 2025-04-04

## 2025-04-04 RX ORDER — SODIUM CHLORIDE 9 MG/ML
5-250 INJECTION, SOLUTION INTRAVENOUS PRN
OUTPATIENT
Start: 2025-04-04

## 2025-04-04 RX ORDER — EPINEPHRINE 1 MG/ML
0.3 INJECTION, SOLUTION, CONCENTRATE INTRAVENOUS PRN
OUTPATIENT
Start: 2025-04-04

## 2025-04-04 RX ORDER — ALBUTEROL SULFATE 90 UG/1
4 INHALANT RESPIRATORY (INHALATION) PRN
OUTPATIENT
Start: 2025-04-04

## 2025-04-04 RX ORDER — DIPHENHYDRAMINE HYDROCHLORIDE 50 MG/ML
50 INJECTION, SOLUTION INTRAMUSCULAR; INTRAVENOUS
OUTPATIENT
Start: 2025-04-04

## 2025-04-04 RX ORDER — ACETAMINOPHEN 325 MG/1
650 TABLET ORAL
OUTPATIENT
Start: 2025-04-04

## 2025-04-04 RX ORDER — ONDANSETRON 2 MG/ML
8 INJECTION INTRAMUSCULAR; INTRAVENOUS
OUTPATIENT
Start: 2025-04-04

## 2025-04-04 RX ORDER — HYDROCORTISONE SODIUM SUCCINATE 100 MG/2ML
100 INJECTION INTRAMUSCULAR; INTRAVENOUS
OUTPATIENT
Start: 2025-04-04

## 2025-04-04 RX ORDER — SODIUM CHLORIDE 0.9 % (FLUSH) 0.9 %
5-40 SYRINGE (ML) INJECTION PRN
OUTPATIENT
Start: 2025-04-04

## 2025-04-08 ENCOUNTER — TELEPHONE (OUTPATIENT)
Dept: UROLOGY | Age: 65
End: 2025-04-08

## 2025-04-08 NOTE — TELEPHONE ENCOUNTER
Pt left voicemail to reschedule appt from 4-2, pt states he had to go to the hospital. Call was returned and appt was scheduled for 5-7. Pt confirmed new date/time

## 2025-05-01 ENCOUNTER — HOSPITAL ENCOUNTER (OUTPATIENT)
Dept: SLEEP MEDICINE | Age: 65
Discharge: HOME | End: 2025-05-04
Payer: COMMERCIAL

## 2025-05-01 PROCEDURE — 95811 POLYSOM 6/>YRS CPAP 4/> PARM: CPT

## 2025-05-05 ENCOUNTER — CLINICAL SUPPORT (OUTPATIENT)
Age: 65
End: 2025-05-05
Payer: COMMERCIAL

## 2025-05-05 VITALS
OXYGEN SATURATION: 97 % | SYSTOLIC BLOOD PRESSURE: 138 MMHG | HEART RATE: 73 BPM | RESPIRATION RATE: 16 BRPM | DIASTOLIC BLOOD PRESSURE: 78 MMHG | TEMPERATURE: 97.2 F

## 2025-05-05 DIAGNOSIS — D50.9 IRON DEFICIENCY ANEMIA, UNSPECIFIED IRON DEFICIENCY ANEMIA TYPE: Primary | ICD-10-CM

## 2025-05-05 PROCEDURE — 96365 THER/PROPH/DIAG IV INF INIT: CPT | Performed by: PSYCHIATRY & NEUROLOGY

## 2025-05-05 PROCEDURE — 96366 THER/PROPH/DIAG IV INF ADDON: CPT | Performed by: PSYCHIATRY & NEUROLOGY

## 2025-05-05 RX ORDER — ALBUTEROL SULFATE 90 UG/1
4 INHALANT RESPIRATORY (INHALATION) PRN
OUTPATIENT
Start: 2025-05-19

## 2025-05-05 RX ORDER — ALBUTEROL SULFATE 90 UG/1
4 INHALANT RESPIRATORY (INHALATION) PRN
Status: DISCONTINUED | OUTPATIENT
Start: 2025-05-05 | End: 2025-05-05 | Stop reason: HOSPADM

## 2025-05-05 RX ORDER — EPINEPHRINE 1 MG/ML
0.3 INJECTION, SOLUTION, CONCENTRATE INTRAVENOUS PRN
Status: DISCONTINUED | OUTPATIENT
Start: 2025-05-05 | End: 2025-05-05 | Stop reason: HOSPADM

## 2025-05-05 RX ORDER — EPINEPHRINE 1 MG/ML
0.3 INJECTION, SOLUTION, CONCENTRATE INTRAVENOUS PRN
OUTPATIENT
Start: 2025-05-19

## 2025-05-05 RX ORDER — SODIUM CHLORIDE 9 MG/ML
5-250 INJECTION, SOLUTION INTRAVENOUS PRN
Status: DISCONTINUED | OUTPATIENT
Start: 2025-05-05 | End: 2025-05-05 | Stop reason: HOSPADM

## 2025-05-05 RX ORDER — SODIUM CHLORIDE 9 MG/ML
INJECTION, SOLUTION INTRAVENOUS CONTINUOUS
OUTPATIENT
Start: 2025-05-19

## 2025-05-05 RX ORDER — SODIUM CHLORIDE 0.9 % (FLUSH) 0.9 %
5-40 SYRINGE (ML) INJECTION PRN
Status: DISCONTINUED | OUTPATIENT
Start: 2025-05-05 | End: 2025-05-05 | Stop reason: HOSPADM

## 2025-05-05 RX ORDER — HEPARIN 100 UNIT/ML
500 SYRINGE INTRAVENOUS PRN
Status: DISCONTINUED | OUTPATIENT
Start: 2025-05-05 | End: 2025-05-05 | Stop reason: HOSPADM

## 2025-05-05 RX ORDER — ACETAMINOPHEN 325 MG/1
650 TABLET ORAL
OUTPATIENT
Start: 2025-05-19

## 2025-05-05 RX ORDER — ONDANSETRON 2 MG/ML
8 INJECTION INTRAMUSCULAR; INTRAVENOUS
OUTPATIENT
Start: 2025-05-19

## 2025-05-05 RX ORDER — ONDANSETRON 2 MG/ML
8 INJECTION INTRAMUSCULAR; INTRAVENOUS
Status: DISCONTINUED | OUTPATIENT
Start: 2025-05-05 | End: 2025-05-05 | Stop reason: HOSPADM

## 2025-05-05 RX ORDER — HEPARIN 100 UNIT/ML
500 SYRINGE INTRAVENOUS PRN
OUTPATIENT
Start: 2025-05-19

## 2025-05-05 RX ORDER — SODIUM CHLORIDE 9 MG/ML
5-250 INJECTION, SOLUTION INTRAVENOUS PRN
OUTPATIENT
Start: 2025-05-19

## 2025-05-05 RX ORDER — DIPHENHYDRAMINE HYDROCHLORIDE 50 MG/ML
50 INJECTION, SOLUTION INTRAMUSCULAR; INTRAVENOUS
Status: DISCONTINUED | OUTPATIENT
Start: 2025-05-05 | End: 2025-05-05 | Stop reason: HOSPADM

## 2025-05-05 RX ORDER — ACETAMINOPHEN 325 MG/1
650 TABLET ORAL
Status: DISCONTINUED | OUTPATIENT
Start: 2025-05-05 | End: 2025-05-05 | Stop reason: HOSPADM

## 2025-05-05 RX ORDER — DIPHENHYDRAMINE HYDROCHLORIDE 50 MG/ML
50 INJECTION, SOLUTION INTRAMUSCULAR; INTRAVENOUS
OUTPATIENT
Start: 2025-05-19

## 2025-05-05 RX ORDER — HYDROCORTISONE SODIUM SUCCINATE 100 MG/2ML
100 INJECTION INTRAMUSCULAR; INTRAVENOUS
OUTPATIENT
Start: 2025-05-19

## 2025-05-05 RX ORDER — HYDROCORTISONE SODIUM SUCCINATE 100 MG/2ML
100 INJECTION INTRAMUSCULAR; INTRAVENOUS
Status: DISCONTINUED | OUTPATIENT
Start: 2025-05-05 | End: 2025-05-05 | Stop reason: HOSPADM

## 2025-05-05 RX ORDER — SODIUM CHLORIDE 0.9 % (FLUSH) 0.9 %
5-40 SYRINGE (ML) INJECTION PRN
OUTPATIENT
Start: 2025-05-19

## 2025-05-05 RX ORDER — SODIUM CHLORIDE 9 MG/ML
INJECTION, SOLUTION INTRAVENOUS CONTINUOUS
Status: DISCONTINUED | OUTPATIENT
Start: 2025-05-05 | End: 2025-05-05 | Stop reason: HOSPADM

## 2025-05-05 NOTE — PROGRESS NOTES
[facility]  [address], [address]  [place] [address]  Office : [phone], Fax: [phone]     Patient arrived ambulatory to the infusion suite today for an iron infusion. Vital signs WNL. No contraindications noted. Patient offered warm blanket and pillow for comfort. Patient up ad jesus to BR; offered drink and snacks during visit.    20g 1\" IV placed in the right AC x 1 attempt; flushed with 10ml NS. Patient tolerated well.   Venofer 300mg in 250ml NS administered at  177  ml/hr. Infusion Time: 1 hour 36 minutes.   Patient tolerated the infusion well, no complications noted; including 30 minutes post observation. Post infusion vital signs WNL.      PIV flushed with 10ml NS and removed without difficulty, catheter intact; dressing applied. Patient instructed to leave the dressing on for at least 30 minutes before removal.         Patient discharged ambulatory with steady gait out of infusion suite, feeling well. Patient instructed to call the ordering provider with any post-infusion issues.     Next appointment scheduled at a date/time convenient for them prior to patient's departure today.

## 2025-05-07 ENCOUNTER — OFFICE VISIT (OUTPATIENT)
Dept: UROLOGY | Age: 65
End: 2025-05-07
Payer: COMMERCIAL

## 2025-05-07 DIAGNOSIS — R97.20 ELEVATED PROSTATE SPECIFIC ANTIGEN (PSA): Primary | ICD-10-CM

## 2025-05-07 DIAGNOSIS — N13.8 BPH WITH OBSTRUCTION/LOWER URINARY TRACT SYMPTOMS: ICD-10-CM

## 2025-05-07 DIAGNOSIS — N40.1 BPH WITH OBSTRUCTION/LOWER URINARY TRACT SYMPTOMS: ICD-10-CM

## 2025-05-07 DIAGNOSIS — N40.0 BENIGN PROSTATIC HYPERPLASIA WITHOUT LOWER URINARY TRACT SYMPTOMS: ICD-10-CM

## 2025-05-07 DIAGNOSIS — N52.01 ERECTILE DYSFUNCTION DUE TO ARTERIAL INSUFFICIENCY: ICD-10-CM

## 2025-05-07 LAB
BILIRUBIN, URINE, POC: NEGATIVE
BLOOD URINE, POC: NEGATIVE
GLUCOSE URINE, POC: NEGATIVE MG/DL
KETONES, URINE, POC: NEGATIVE MG/DL
LEUKOCYTE ESTERASE, URINE, POC: NEGATIVE
NITRITE, URINE, POC: NEGATIVE
PH, URINE, POC: 6.5 (ref 4.6–8)
PROTEIN,URINE, POC: NORMAL MG/DL
SPECIFIC GRAVITY, URINE, POC: 1.02 (ref 1–1.03)
URINALYSIS CLARITY, POC: NORMAL
URINALYSIS COLOR, POC: NORMAL
UROBILINOGEN, POC: NORMAL MG/DL

## 2025-05-07 PROCEDURE — 81003 URINALYSIS AUTO W/O SCOPE: CPT | Performed by: UROLOGY

## 2025-05-07 PROCEDURE — 99214 OFFICE O/P EST MOD 30 MIN: CPT | Performed by: UROLOGY

## 2025-05-07 RX ORDER — TADALAFIL 5 MG/1
5 TABLET ORAL DAILY
Qty: 90 TABLET | Refills: 3 | Status: SHIPPED | OUTPATIENT
Start: 2025-05-07

## 2025-05-07 RX ORDER — TAMSULOSIN HYDROCHLORIDE 0.4 MG/1
0.4 CAPSULE ORAL DAILY
Qty: 90 CAPSULE | Refills: 3 | Status: SHIPPED | OUTPATIENT
Start: 2025-05-07

## 2025-05-07 RX ORDER — DICLOFENAC POTASSIUM 50 MG/1
TABLET, FILM COATED ORAL
COMMUNITY

## 2025-05-07 RX ORDER — HYDROCODONE BITARTRATE AND ACETAMINOPHEN 5; 325 MG/1; MG/1
TABLET ORAL
COMMUNITY

## 2025-05-07 RX ORDER — FINASTERIDE 5 MG/1
5 TABLET, FILM COATED ORAL DAILY
Qty: 90 TABLET | Refills: 3 | Status: SHIPPED | OUTPATIENT
Start: 2025-05-07

## 2025-05-07 RX ORDER — TADALAFIL 20 MG/1
TABLET ORAL
COMMUNITY

## 2025-05-07 RX ORDER — METHYLPREDNISOLONE 4 MG/1
TABLET ORAL
COMMUNITY
Start: 2025-02-03

## 2025-05-07 RX ORDER — MULTIVITAMIN
1 TABLET ORAL
COMMUNITY

## 2025-05-07 NOTE — PROGRESS NOTES
Social Drivers of Health     Financial Resource Strain: Low Risk  (7/16/2024)    Overall Financial Resource Strain (CARDIA)     Difficulty of Paying Living Expenses: Not very hard   Food Insecurity: No Food Insecurity (3/31/2025)    Hunger Vital Sign     Worried About Running Out of Food in the Last Year: Never true     Ran Out of Food in the Last Year: Never true   Transportation Needs: No Transportation Needs (3/31/2025)    PRAPARE - Transportation     Lack of Transportation (Medical): No     Lack of Transportation (Non-Medical): No   Physical Activity: Insufficiently Active (10/16/2024)    Received from [facility]    Physical Activity     Days of Exercise per Week: 5 days     On average, how many minutes do you exercise per day?: 20     Total Minutes of Exercise Per Week: 100   Stress: Not on file   Social Connections: Unknown (3/20/2021)    Received from [facility]    Social Connections     Frequency of Communication with Friends and Family: Not asked     Frequency of Social Gatherings with Friends and Family: Not asked   Intimate Partner Violence: Unknown (3/20/2021)    Received from [facility]    Intimate Partner Violence     Fear of Current or Ex-Partner: Not asked     Emotionally Abused: Not asked     Physically Abused: Not asked     Sexually Abused: Not asked   Housing Stability: Low Risk  (3/31/2025)    Housing Stability Vital Sign     Unable to Pay for Housing in the Last Year: No     Number of Times Moved in the Last Year: 0     Homeless in the Last Year: No     Family History   Problem Relation Age of Onset    Hypertension Sister     Hypertension Sister     Hypertension Mother     Lung Disease Father        Review of Systems  Constitutional:   Negative for fever.  GI:  Negative for nausea.  Genitourinary: Positive for testicular pain.  Musculoskeletal:  Negative for back pain.    Urinalysis  UA - Dipstick  Results for orders placed or performed in visit on

## 2025-05-08 LAB — PSA SERPL DL<=0.01 NG/ML-MCNC: 9.06 NG/ML (ref 0–4)

## 2025-05-12 ENCOUNTER — CLINICAL SUPPORT (OUTPATIENT)
Age: 65
End: 2025-05-12
Payer: COMMERCIAL

## 2025-05-12 VITALS
RESPIRATION RATE: 16 BRPM | DIASTOLIC BLOOD PRESSURE: 77 MMHG | TEMPERATURE: 98.1 F | OXYGEN SATURATION: 96 % | HEART RATE: 71 BPM | SYSTOLIC BLOOD PRESSURE: 144 MMHG

## 2025-05-12 DIAGNOSIS — D50.9 IRON DEFICIENCY ANEMIA, UNSPECIFIED IRON DEFICIENCY ANEMIA TYPE: Primary | ICD-10-CM

## 2025-05-12 PROCEDURE — 96365 THER/PROPH/DIAG IV INF INIT: CPT | Performed by: PSYCHIATRY & NEUROLOGY

## 2025-05-12 PROCEDURE — 96366 THER/PROPH/DIAG IV INF ADDON: CPT | Performed by: PSYCHIATRY & NEUROLOGY

## 2025-05-12 RX ORDER — DIPHENHYDRAMINE HYDROCHLORIDE 50 MG/ML
50 INJECTION, SOLUTION INTRAMUSCULAR; INTRAVENOUS
Status: DISCONTINUED | OUTPATIENT
Start: 2025-05-12 | End: 2025-05-12 | Stop reason: HOSPADM

## 2025-05-12 RX ORDER — DIPHENHYDRAMINE HYDROCHLORIDE 50 MG/ML
50 INJECTION, SOLUTION INTRAMUSCULAR; INTRAVENOUS
OUTPATIENT
Start: 2025-05-19

## 2025-05-12 RX ORDER — ONDANSETRON 2 MG/ML
8 INJECTION INTRAMUSCULAR; INTRAVENOUS
Status: DISCONTINUED | OUTPATIENT
Start: 2025-05-12 | End: 2025-05-12 | Stop reason: HOSPADM

## 2025-05-12 RX ORDER — EPINEPHRINE 1 MG/ML
0.3 INJECTION, SOLUTION, CONCENTRATE INTRAVENOUS PRN
OUTPATIENT
Start: 2025-05-19

## 2025-05-12 RX ORDER — SODIUM CHLORIDE 9 MG/ML
INJECTION, SOLUTION INTRAVENOUS CONTINUOUS
OUTPATIENT
Start: 2025-05-19

## 2025-05-12 RX ORDER — HEPARIN 100 UNIT/ML
500 SYRINGE INTRAVENOUS PRN
Status: DISCONTINUED | OUTPATIENT
Start: 2025-05-12 | End: 2025-05-12 | Stop reason: HOSPADM

## 2025-05-12 RX ORDER — SODIUM CHLORIDE 9 MG/ML
5-250 INJECTION, SOLUTION INTRAVENOUS PRN
OUTPATIENT
Start: 2025-05-19

## 2025-05-12 RX ORDER — SODIUM CHLORIDE 0.9 % (FLUSH) 0.9 %
5-40 SYRINGE (ML) INJECTION PRN
OUTPATIENT
Start: 2025-05-19

## 2025-05-12 RX ORDER — ALBUTEROL SULFATE 90 UG/1
4 INHALANT RESPIRATORY (INHALATION) PRN
OUTPATIENT
Start: 2025-05-19

## 2025-05-12 RX ORDER — ACETAMINOPHEN 325 MG/1
650 TABLET ORAL
Status: DISCONTINUED | OUTPATIENT
Start: 2025-05-12 | End: 2025-05-12 | Stop reason: HOSPADM

## 2025-05-12 RX ORDER — ALBUTEROL SULFATE 90 UG/1
4 INHALANT RESPIRATORY (INHALATION) PRN
Status: DISCONTINUED | OUTPATIENT
Start: 2025-05-12 | End: 2025-05-12 | Stop reason: HOSPADM

## 2025-05-12 RX ORDER — EPINEPHRINE 1 MG/ML
0.3 INJECTION, SOLUTION, CONCENTRATE INTRAVENOUS PRN
Status: DISCONTINUED | OUTPATIENT
Start: 2025-05-12 | End: 2025-05-12 | Stop reason: HOSPADM

## 2025-05-12 RX ORDER — ONDANSETRON 2 MG/ML
8 INJECTION INTRAMUSCULAR; INTRAVENOUS
OUTPATIENT
Start: 2025-05-19

## 2025-05-12 RX ORDER — ACETAMINOPHEN 325 MG/1
650 TABLET ORAL
OUTPATIENT
Start: 2025-05-19

## 2025-05-12 RX ORDER — SODIUM CHLORIDE 0.9 % (FLUSH) 0.9 %
5-40 SYRINGE (ML) INJECTION PRN
Status: DISCONTINUED | OUTPATIENT
Start: 2025-05-12 | End: 2025-05-12 | Stop reason: HOSPADM

## 2025-05-12 RX ORDER — SODIUM CHLORIDE 9 MG/ML
INJECTION, SOLUTION INTRAVENOUS CONTINUOUS
Status: DISCONTINUED | OUTPATIENT
Start: 2025-05-12 | End: 2025-05-12 | Stop reason: HOSPADM

## 2025-05-12 RX ORDER — HYDROCORTISONE SODIUM SUCCINATE 100 MG/2ML
100 INJECTION INTRAMUSCULAR; INTRAVENOUS
Status: DISCONTINUED | OUTPATIENT
Start: 2025-05-12 | End: 2025-05-12 | Stop reason: HOSPADM

## 2025-05-12 RX ORDER — HYDROCORTISONE SODIUM SUCCINATE 100 MG/2ML
100 INJECTION INTRAMUSCULAR; INTRAVENOUS
OUTPATIENT
Start: 2025-05-19

## 2025-05-12 RX ORDER — HEPARIN 100 UNIT/ML
500 SYRINGE INTRAVENOUS PRN
OUTPATIENT
Start: 2025-05-19

## 2025-05-12 NOTE — PROGRESS NOTES
[facility]  [address], [address]  [place] [address]  Office : [phone], Fax: [phone]      Patient arrived ambulatory to the infusion suite today for an iron infusion. Vital signs WNL. No contraindications noted. Patient offered warm blanket and pillow for comfort. Patient up ad jesus to BR; offered drink and snacks during visit.     20g 1\" IV placed in the left anterior proximal forearm x1 attempt; flushed with 10ml NS. Patient tolerated well.   Venofer 300mg in 250ml NS administered at 177ml/hr. Infusion Time: 1 hour, 37 minutes.   Patient tolerated the infusion well, no complications noted; including 30 minutes post observation. Post infusion vital signs WNL.      PIV flushed with 10ml NS and removed without difficulty, catheter intact; dressing applied. Patient instructed to leave the dressing on for at least 30 minutes before removal.         Patient discharged ambulatory with steady gait out of infusion suite, feeling well. Patient instructed to call the ordering provider with any post-infusion issues.     Next appointment scheduled at a date/time convenient for them prior to patient's departure today.

## 2025-05-19 ENCOUNTER — CLINICAL SUPPORT (OUTPATIENT)
Age: 65
End: 2025-05-19
Payer: COMMERCIAL

## 2025-05-19 VITALS
HEART RATE: 65 BPM | TEMPERATURE: 98.3 F | SYSTOLIC BLOOD PRESSURE: 138 MMHG | DIASTOLIC BLOOD PRESSURE: 80 MMHG | OXYGEN SATURATION: 96 % | RESPIRATION RATE: 16 BRPM

## 2025-05-19 DIAGNOSIS — D50.9 IRON DEFICIENCY ANEMIA, UNSPECIFIED IRON DEFICIENCY ANEMIA TYPE: Primary | ICD-10-CM

## 2025-05-19 PROCEDURE — 96366 THER/PROPH/DIAG IV INF ADDON: CPT | Performed by: PSYCHIATRY & NEUROLOGY

## 2025-05-19 PROCEDURE — 96365 THER/PROPH/DIAG IV INF INIT: CPT | Performed by: PSYCHIATRY & NEUROLOGY

## 2025-05-19 RX ORDER — SODIUM CHLORIDE 0.9 % (FLUSH) 0.9 %
5-40 SYRINGE (ML) INJECTION PRN
Status: CANCELLED | OUTPATIENT
Start: 2025-06-02

## 2025-05-19 RX ORDER — SODIUM CHLORIDE 9 MG/ML
INJECTION, SOLUTION INTRAVENOUS CONTINUOUS
Status: CANCELLED | OUTPATIENT
Start: 2025-06-02

## 2025-05-19 RX ORDER — DIPHENHYDRAMINE HYDROCHLORIDE 50 MG/ML
50 INJECTION, SOLUTION INTRAMUSCULAR; INTRAVENOUS
Status: DISCONTINUED | OUTPATIENT
Start: 2025-05-19 | End: 2025-05-19 | Stop reason: HOSPADM

## 2025-05-19 RX ORDER — HEPARIN 100 UNIT/ML
500 SYRINGE INTRAVENOUS PRN
Status: CANCELLED | OUTPATIENT
Start: 2025-06-02

## 2025-05-19 RX ORDER — ACETAMINOPHEN 325 MG/1
650 TABLET ORAL
Status: DISCONTINUED | OUTPATIENT
Start: 2025-05-19 | End: 2025-05-19 | Stop reason: HOSPADM

## 2025-05-19 RX ORDER — EPINEPHRINE 1 MG/ML
0.3 INJECTION, SOLUTION, CONCENTRATE INTRAVENOUS PRN
Status: CANCELLED | OUTPATIENT
Start: 2025-06-02

## 2025-05-19 RX ORDER — SODIUM CHLORIDE 0.9 % (FLUSH) 0.9 %
5-40 SYRINGE (ML) INJECTION PRN
Status: DISCONTINUED | OUTPATIENT
Start: 2025-05-19 | End: 2025-05-19 | Stop reason: HOSPADM

## 2025-05-19 RX ORDER — HYDROCORTISONE SODIUM SUCCINATE 100 MG/2ML
100 INJECTION INTRAMUSCULAR; INTRAVENOUS
Status: DISCONTINUED | OUTPATIENT
Start: 2025-05-19 | End: 2025-05-19 | Stop reason: HOSPADM

## 2025-05-19 RX ORDER — EPINEPHRINE 1 MG/ML
0.3 INJECTION, SOLUTION, CONCENTRATE INTRAVENOUS PRN
Status: DISCONTINUED | OUTPATIENT
Start: 2025-05-19 | End: 2025-05-19 | Stop reason: HOSPADM

## 2025-05-19 RX ORDER — ALBUTEROL SULFATE 90 UG/1
4 INHALANT RESPIRATORY (INHALATION) PRN
Status: DISCONTINUED | OUTPATIENT
Start: 2025-05-19 | End: 2025-05-19 | Stop reason: HOSPADM

## 2025-05-19 RX ORDER — HEPARIN 100 UNIT/ML
500 SYRINGE INTRAVENOUS PRN
Status: DISCONTINUED | OUTPATIENT
Start: 2025-05-19 | End: 2025-05-19 | Stop reason: HOSPADM

## 2025-05-19 RX ORDER — ALBUTEROL SULFATE 90 UG/1
4 INHALANT RESPIRATORY (INHALATION) PRN
Status: CANCELLED | OUTPATIENT
Start: 2025-06-02

## 2025-05-19 RX ORDER — ACETAMINOPHEN 325 MG/1
650 TABLET ORAL
Status: CANCELLED | OUTPATIENT
Start: 2025-06-02

## 2025-05-19 RX ORDER — SODIUM CHLORIDE 9 MG/ML
5-250 INJECTION, SOLUTION INTRAVENOUS PRN
Status: CANCELLED | OUTPATIENT
Start: 2025-06-02

## 2025-05-19 RX ORDER — ONDANSETRON 2 MG/ML
8 INJECTION INTRAMUSCULAR; INTRAVENOUS
Status: CANCELLED | OUTPATIENT
Start: 2025-06-02

## 2025-05-19 RX ORDER — DIPHENHYDRAMINE HYDROCHLORIDE 50 MG/ML
50 INJECTION, SOLUTION INTRAMUSCULAR; INTRAVENOUS
Status: CANCELLED | OUTPATIENT
Start: 2025-06-02

## 2025-05-19 RX ORDER — HYDROCORTISONE SODIUM SUCCINATE 100 MG/2ML
100 INJECTION INTRAMUSCULAR; INTRAVENOUS
Status: CANCELLED | OUTPATIENT
Start: 2025-06-02

## 2025-05-19 RX ORDER — SODIUM CHLORIDE 9 MG/ML
INJECTION, SOLUTION INTRAVENOUS CONTINUOUS
Status: DISCONTINUED | OUTPATIENT
Start: 2025-05-19 | End: 2025-05-19 | Stop reason: HOSPADM

## 2025-05-19 RX ORDER — ONDANSETRON 2 MG/ML
8 INJECTION INTRAMUSCULAR; INTRAVENOUS
Status: DISCONTINUED | OUTPATIENT
Start: 2025-05-19 | End: 2025-05-19 | Stop reason: HOSPADM

## 2025-06-23 PROBLEM — G47.33 OSA ON CPAP: Status: ACTIVE | Noted: 2025-06-23

## 2025-06-25 ENCOUNTER — TELEPHONE (OUTPATIENT)
Dept: SLEEP MEDICINE | Age: 65
End: 2025-06-25

## 2025-07-01 ENCOUNTER — OFFICE VISIT (OUTPATIENT)
Dept: FAMILY MEDICINE CLINIC | Facility: CLINIC | Age: 65
End: 2025-07-01
Payer: COMMERCIAL

## 2025-07-01 VITALS
HEIGHT: 72 IN | SYSTOLIC BLOOD PRESSURE: 123 MMHG | DIASTOLIC BLOOD PRESSURE: 77 MMHG | TEMPERATURE: 97.7 F | BODY MASS INDEX: 36.98 KG/M2 | HEART RATE: 72 BPM | RESPIRATION RATE: 18 BRPM | WEIGHT: 273 LBS

## 2025-07-01 DIAGNOSIS — E66.01 MORBID OBESITY (HCC): ICD-10-CM

## 2025-07-01 DIAGNOSIS — K30 NON-ULCER DYSPEPSIA: ICD-10-CM

## 2025-07-01 DIAGNOSIS — G47.33 OSA ON CPAP: ICD-10-CM

## 2025-07-01 DIAGNOSIS — E78.00 PURE HYPERCHOLESTEROLEMIA: ICD-10-CM

## 2025-07-01 DIAGNOSIS — N40.0 BENIGN PROSTATIC HYPERPLASIA WITHOUT LOWER URINARY TRACT SYMPTOMS: ICD-10-CM

## 2025-07-01 DIAGNOSIS — D70.9 NEUTROPENIA, UNSPECIFIED TYPE: ICD-10-CM

## 2025-07-01 DIAGNOSIS — D50.9 IRON DEFICIENCY ANEMIA, UNSPECIFIED IRON DEFICIENCY ANEMIA TYPE: ICD-10-CM

## 2025-07-01 DIAGNOSIS — I10 PRIMARY HYPERTENSION: Primary | ICD-10-CM

## 2025-07-01 LAB
ANION GAP SERPL CALC-SCNC: 10 MMOL/L (ref 7–16)
BASOPHILS # BLD: 0 K/UL (ref 0–0.2)
BASOPHILS NFR BLD: 0 % (ref 0–2)
BUN SERPL-MCNC: 11 MG/DL (ref 8–23)
CALCIUM SERPL-MCNC: 9.7 MG/DL (ref 8.8–10.2)
CHLORIDE SERPL-SCNC: 104 MMOL/L (ref 98–107)
CO2 SERPL-SCNC: 26 MMOL/L (ref 20–29)
CREAT SERPL-MCNC: 1.13 MG/DL (ref 0.8–1.3)
DIFFERENTIAL METHOD BLD: ABNORMAL
EOSINOPHIL # BLD: 0.14 K/UL (ref 0–0.8)
EOSINOPHIL NFR BLD: 5.7 % (ref 0.5–7.8)
ERYTHROCYTE [DISTWIDTH] IN BLOOD BY AUTOMATED COUNT: 21.1 % (ref 11.9–14.6)
EST. AVERAGE GLUCOSE BLD GHB EST-MCNC: 88 MG/DL
FERRITIN SERPL-MCNC: 141 NG/ML (ref 8–388)
GLUCOSE SERPL-MCNC: 101 MG/DL (ref 70–99)
HBA1C MFR BLD: 4.7 % (ref 0–5.6)
HCT VFR BLD AUTO: 39.9 % (ref 41.1–50.3)
HGB BLD-MCNC: 12 G/DL (ref 13.6–17.2)
IMM GRANULOCYTES # BLD AUTO: 0 K/UL (ref 0–0.5)
IMM GRANULOCYTES NFR BLD AUTO: 0 % (ref 0–5)
LYMPHOCYTES # BLD: 0.66 K/UL (ref 0.5–4.6)
LYMPHOCYTES NFR BLD: 27 % (ref 13–44)
MCH RBC QN AUTO: 24.2 PG (ref 26.1–32.9)
MCHC RBC AUTO-ENTMCNC: 30.1 G/DL (ref 31.4–35)
MCV RBC AUTO: 80.4 FL (ref 82–102)
MONOCYTES # BLD: 0.3 K/UL (ref 0.1–1.3)
MONOCYTES NFR BLD: 12.3 % (ref 4–12)
NEUTS SEG # BLD: 1.34 K/UL (ref 1.7–8.2)
NEUTS SEG NFR BLD: 55 % (ref 43–78)
NRBC # BLD: 0 K/UL (ref 0–0.2)
PLATELET # BLD AUTO: 252 K/UL (ref 150–450)
PMV BLD AUTO: ABNORMAL FL (ref 9.4–12.3)
POTASSIUM SERPL-SCNC: 4.3 MMOL/L (ref 3.5–5.1)
RBC # BLD AUTO: 4.96 M/UL (ref 4.23–5.6)
SODIUM SERPL-SCNC: 140 MMOL/L (ref 136–145)
WBC # BLD AUTO: 2.4 K/UL (ref 4.3–11.1)

## 2025-07-01 PROCEDURE — 3074F SYST BP LT 130 MM HG: CPT | Performed by: FAMILY MEDICINE

## 2025-07-01 PROCEDURE — 99214 OFFICE O/P EST MOD 30 MIN: CPT | Performed by: FAMILY MEDICINE

## 2025-07-01 PROCEDURE — 3078F DIAST BP <80 MM HG: CPT | Performed by: FAMILY MEDICINE

## 2025-07-01 RX ORDER — AMLODIPINE AND BENAZEPRIL HYDROCHLORIDE 5; 20 MG/1; MG/1
1 CAPSULE ORAL DAILY
Qty: 90 CAPSULE | Refills: 1 | Status: SHIPPED | OUTPATIENT
Start: 2025-07-01

## 2025-07-01 RX ORDER — TAMSULOSIN HYDROCHLORIDE 0.4 MG/1
0.4 CAPSULE ORAL DAILY
Qty: 90 CAPSULE | Refills: 1 | Status: SHIPPED | OUTPATIENT
Start: 2025-07-01

## 2025-07-01 RX ORDER — ATORVASTATIN CALCIUM 20 MG/1
20 TABLET, FILM COATED ORAL DAILY
Qty: 90 TABLET | Refills: 1 | Status: SHIPPED | OUTPATIENT
Start: 2025-07-01

## 2025-07-01 SDOH — ECONOMIC STABILITY: FOOD INSECURITY: WITHIN THE PAST 12 MONTHS, YOU WORRIED THAT YOUR FOOD WOULD RUN OUT BEFORE YOU GOT MONEY TO BUY MORE.: NEVER TRUE

## 2025-07-01 SDOH — ECONOMIC STABILITY: FOOD INSECURITY: WITHIN THE PAST 12 MONTHS, THE FOOD YOU BOUGHT JUST DIDN'T LAST AND YOU DIDN'T HAVE MONEY TO GET MORE.: NEVER TRUE

## 2025-07-01 ASSESSMENT — PATIENT HEALTH QUESTIONNAIRE - PHQ9
SUM OF ALL RESPONSES TO PHQ QUESTIONS 1-9: 0
2. FEELING DOWN, DEPRESSED OR HOPELESS: NOT AT ALL
SUM OF ALL RESPONSES TO PHQ QUESTIONS 1-9: 0
1. LITTLE INTEREST OR PLEASURE IN DOING THINGS: NOT AT ALL

## 2025-07-01 NOTE — PROGRESS NOTES
Subjective:  Daniel Mane is a 64 y.o. male presents today for their semi-annual htn visit. They are having no side effects and are doing well.  He is also due for follow-up of anemia.  Struggles with his weight.  Has a follow-up soon with pulmonology to discuss his KELLEE.  Also has follow-up in about 3 months with urology  Systems review of cardiovascular and pulmonary systems reveal no complaints or pertinent positives.  Patient denies any pounding heart beats or rapid heart beat intervals, flushing, panic like attacks, headaches, dizzyness or flushing.  They have drug reistant hypertension, on 3 or more different medicaitons including one diuretic- No  PHQ-9 Total Score: 0 (7/1/2025  7:51 AM)      How much are you exercising? Yes 3 d p w  Do you smoke? No  Are you taking your medicines as directed most every day? Yes  Do you follow a low sodium DASH diet or similar high blood pressure diet? No  Do you check your bp at home with an automated blood pressure device? Yes  If you don't have a home bp machine, you should purchase one at home and begin to check your pressure at home on a regular basis.  Your blood pressure goal is listed under the \"plan section\" below    No Known Allergies   reports that he has never smoked. He has never been exposed to tobacco smoke. He has never used smokeless tobacco.  Current Outpatient Medications   Medication Sig Dispense Refill    amLODIPine-benazepril (LOTREL) 5-20 MG per capsule Take 1 capsule by mouth daily 90 capsule 1    atorvastatin (LIPITOR) 20 MG tablet Take 1 tablet by mouth daily 90 tablet 1    tamsulosin (FLOMAX) 0.4 MG capsule Take 1 capsule by mouth daily 90 capsule 1    HYDROcodone-acetaminophen (NORCO) 5-325 MG per tablet TAKE 1 TABLET BY MOUTH EVERY 6 HOURS AS NEEDED FOR MODERATE PAIN OR SEVERE PAIN FOR UP TO 10 DAYS      Multiple Vitamin (MULTI-VITAMIN) TABS Take 1 tablet by mouth      tadalafil (CIALIS) 5 MG tablet Take 1 tablet by mouth daily 90

## 2025-07-02 ENCOUNTER — RESULTS FOLLOW-UP (OUTPATIENT)
Dept: FAMILY MEDICINE CLINIC | Facility: CLINIC | Age: 65
End: 2025-07-02

## 2025-07-10 ENCOUNTER — OFFICE VISIT (OUTPATIENT)
Dept: SLEEP MEDICINE | Age: 65
End: 2025-07-10
Payer: COMMERCIAL

## 2025-07-10 VITALS
OXYGEN SATURATION: 98 % | SYSTOLIC BLOOD PRESSURE: 112 MMHG | HEIGHT: 72 IN | RESPIRATION RATE: 14 BRPM | HEART RATE: 82 BPM | DIASTOLIC BLOOD PRESSURE: 78 MMHG | BODY MASS INDEX: 36.57 KG/M2 | WEIGHT: 270 LBS

## 2025-07-10 DIAGNOSIS — G47.34 NOCTURNAL HYPOXEMIA: ICD-10-CM

## 2025-07-10 DIAGNOSIS — G47.8 NON-RESTORATIVE SLEEP: ICD-10-CM

## 2025-07-10 DIAGNOSIS — G47.61 PLMD (PERIODIC LIMB MOVEMENT DISORDER): ICD-10-CM

## 2025-07-10 DIAGNOSIS — G47.33 OSA ON CPAP: Primary | ICD-10-CM

## 2025-07-10 DIAGNOSIS — E66.01 SEVERE OBESITY (HCC): ICD-10-CM

## 2025-07-10 DIAGNOSIS — R40.0 DAYTIME SLEEPINESS: ICD-10-CM

## 2025-07-10 PROCEDURE — 99204 OFFICE O/P NEW MOD 45 MIN: CPT | Performed by: INTERNAL MEDICINE

## 2025-07-10 PROCEDURE — 3078F DIAST BP <80 MM HG: CPT | Performed by: INTERNAL MEDICINE

## 2025-07-10 PROCEDURE — 3074F SYST BP LT 130 MM HG: CPT | Performed by: INTERNAL MEDICINE

## (undated) DEVICE — STERILE PACKED. BORE DIAMETER 1.6 MM; ANGLE OF INSERTION 19° TO THE LONG AXIS OF THE TRANSDUCER: Brand: SINGLE-USE BIPLANE BIOPSY GUIDE

## (undated) DEVICE — DRAPE TWL SURG 16X26IN BLU ORB04] ALLCARE INC]

## (undated) DEVICE — MAX-CORE® DISPOSABLE CORE BIOPSY INSTRUMENT, 18G X 25CM: Brand: MAX-CORE

## (undated) DEVICE — KIT BX PROST 20ML VI PREFIL W 10ML 10% NEUT BUFF FRMLN LEAK